# Patient Record
Sex: MALE | Race: WHITE
[De-identification: names, ages, dates, MRNs, and addresses within clinical notes are randomized per-mention and may not be internally consistent; named-entity substitution may affect disease eponyms.]

---

## 2018-07-17 ENCOUNTER — HOSPITAL ENCOUNTER (EMERGENCY)
Dept: HOSPITAL 97 - ER | Age: 83
Discharge: ANOTHER HOSPITAL | End: 2018-07-17
Payer: COMMERCIAL

## 2018-07-17 VITALS — DIASTOLIC BLOOD PRESSURE: 73 MMHG | SYSTOLIC BLOOD PRESSURE: 154 MMHG

## 2018-07-17 VITALS — OXYGEN SATURATION: 97 %

## 2018-07-17 VITALS — TEMPERATURE: 98.2 F

## 2018-07-17 DIAGNOSIS — I26.99: Primary | ICD-10-CM

## 2018-07-17 DIAGNOSIS — E78.5: ICD-10-CM

## 2018-07-17 DIAGNOSIS — I82.4Z3: ICD-10-CM

## 2018-07-17 DIAGNOSIS — I10: ICD-10-CM

## 2018-07-17 DIAGNOSIS — F41.9: ICD-10-CM

## 2018-07-17 DIAGNOSIS — Z86.73: ICD-10-CM

## 2018-07-17 LAB
BUN BLD-MCNC: 21 MG/DL (ref 7–18)
GLUCOSE SERPLBLD-MCNC: 103 MG/DL (ref 74–106)
HCT VFR BLD CALC: 43.2 % (ref 39.6–49)
INR BLD: 1.11
LYMPHOCYTES # SPEC AUTO: 1.4 K/UL (ref 0.7–4.9)
MCH RBC QN AUTO: 31.9 PG (ref 27–35)
MCV RBC: 91.4 FL (ref 80–100)
PMV BLD: 8.5 FL (ref 7.6–11.3)
POTASSIUM SERPL-SCNC: 4.3 MMOL/L (ref 3.5–5.1)
RBC # BLD: 4.72 M/UL (ref 4.33–5.43)

## 2018-07-17 PROCEDURE — 80048 BASIC METABOLIC PNL TOTAL CA: CPT

## 2018-07-17 PROCEDURE — 84484 ASSAY OF TROPONIN QUANT: CPT

## 2018-07-17 PROCEDURE — 71045 X-RAY EXAM CHEST 1 VIEW: CPT

## 2018-07-17 PROCEDURE — 81003 URINALYSIS AUTO W/O SCOPE: CPT

## 2018-07-17 PROCEDURE — 99285 EMERGENCY DEPT VISIT HI MDM: CPT

## 2018-07-17 PROCEDURE — 93306 TTE W/DOPPLER COMPLETE: CPT

## 2018-07-17 PROCEDURE — 93005 ELECTROCARDIOGRAM TRACING: CPT

## 2018-07-17 PROCEDURE — 71275 CT ANGIOGRAPHY CHEST: CPT

## 2018-07-17 PROCEDURE — 85610 PROTHROMBIN TIME: CPT

## 2018-07-17 PROCEDURE — 96365 THER/PROPH/DIAG IV INF INIT: CPT

## 2018-07-17 PROCEDURE — 36415 COLL VENOUS BLD VENIPUNCTURE: CPT

## 2018-07-17 PROCEDURE — 93970 EXTREMITY STUDY: CPT

## 2018-07-17 PROCEDURE — 85379 FIBRIN DEGRADATION QUANT: CPT

## 2018-07-17 PROCEDURE — 85025 COMPLETE CBC W/AUTO DIFF WBC: CPT

## 2018-07-17 NOTE — XMS REPORT
Encounter CCD: 2012 to 2012

 Created on:2122



Patient:ANA PEREZ

Sex:Male

:1935

External Reference #:[id number]





Demographics







 Address  [address] [place] [address]-

 

 Home Phone  [phone]

 

 Preferred Language  Unknown

 

 Marital Status  

 

 Taoist Affiliation  Bah[place]

 

 Race  White/

 

 Ethnic Group  Non-









Author







 Organization  [facility]









Care Team Providers







 Name  Role  Phone

 

 Lenny Humphrey  Consulting Provider  [phone]









Allergies, Adverse Reactions, Alerts







 Substance  Reaction  Status

 

 NKDA    Active







Problem List







 Condition  Effective Dates  Status

 

 Acute pain    Active

## 2018-07-17 NOTE — XMS REPORT
Encounter CCD: 2012 to 2012

 Created on:2085



Patient:ANA PEREZ

Sex:Male

:1935

External Reference #:[id number]





Demographics







 Address  [address] [id number]-

 

 Home Phone  [phone]

 

 Preferred Language  Unknown

 

 Marital Status  

 

 Protestant Affiliation  Mandaeism

 

 Race  White/

 

 Ethnic Group  Non-









Author







 Organization  [facility]









Care Team Providers







 Name  Role  Phone

 

 Noah Walls Martin  Consulting Provider  [phone]

 

 Stacey Stuart  Referring Provider  [phone]









Allergies, Adverse Reactions, Alerts







 Substance  Reaction  Status

 

 NKDA    Active







Problem List







 Condition  Effective Dates  Status

 

 Acute pain    Active







Medications







 Medication  Instructions  Start Date  End Date  Status

 

 morphine Sulfate  2 mg, 1 mL, Route: IV,  2012  Discontinued



   Drug form: INJ, Q4H, PRN      



   Pain, Start date:      



   12 9:01:00,      



   Duration: 30 day, Stop      



   date: 12 9:00:00      

 

 acetaminophen-hydrocodone  1 tab, Route: PO, Drug  2012  
Discontinued



 325 mg-10 mg oral tablet  Form: TAB, Q4H, Start      



   date: 12 9:00:00,      



   Duration: 30 day, Stop      



   date: 12 8:00:00      

 

 fosphenytoin  1,000 mg, 20 mL, Route:  2012  Completed



   IVPB, ONCE, Priority:      



   STAT, Start date:      



   12 22:12:00, Stop      



   date: 12 22:12:00      

 

 Dilantin 100 mg oral  100 mg, 1 cap, Route: PO,  2012  
Discontinued



 capsule, extended release  Drug form: ERCAP, TID,      



   Start date: 12      



   9:00:00, Duration: 7 day,      



   Stop date: 12      



   17:00:00      

 

 D5W 1/2NS 1,000 mL  1,000 mL, Rate: 100  2012  Discontinued



   ml/hr, Infuse over: 10      



   hr, Route: IV, Dosing      



   Weight 91 kg, Total      



   Volume: 1,000, Priority:      



   STAT, Start date:      



   12 1:48:00,      



   Duration: 30 day, Stop      



   date: 12 1:47:00      

 

 Lovenox  30 mg, 0.3 mL, Route:  2012  Canceled



   SUB-Q, Drug form: INJ,      



   Q12H, Start date:      



   12 9:00:00,      



   Duration: 30 day, Stop      



   date: 12 21:00:00      

 

 aspirin 81 mg tablet,  Substitution Allowed  2012  
Discontinued



 chewable        

 

 citalopram  Substitution Allowed  2012    Ordered

 

 Sodium Chloride 0.9%  1,000 mL, Rate: 100  2012  Discontinued



 (Bolus) IV 1,000 mL  ml/hr, Infuse over: 10      



   hr, Route: IV, Dosing      



   Weight 90.909 kg, Total      



   Volume: 1,000, Bolus      



   Dose, Priority: STAT,      



   Start date: 12      



   1:24:00, Duration: 1      



   doses or times, Stop      



   date: 12 11:23:00      

 

 cefazolin  1 gm, Route: IVPB, Drug  2012  Completed



   form: PDR/INJ, ONCE,      



   Priority: STAT, Start      



   date: 12 1:23:00,      



   Stop date: 12      



   1:23:00      

 

 Insulin regular  1 unit, 0.01 mL, Route:  2012  Discontinued



   SUB-Q, Drug form: SOLN,      



   TID-Before Meals, PRN      



   Blood Glucose Results,      



   Start date: 12      



   6:05:00, Duration: 30      



   day, Stop date: 12      



   6:04:00      

 

 Insulin regular  3 unit, 0.03 mL, Route:  2012  Discontinued



   SUB-Q, Drug form: SOLN,      



   TID-Before Meals, PRN      



   Blood Glucose Results,      



   Start date: 12      



   6:05:00, Duration: 30      



   day, Stop date: 12      



   6:04:00      

 

 Insulin regular  2 unit, 0.02 mL, Route:  2012  Discontinued



   SUB-Q, Drug form: SOLN,      



   TID-Before Meals, PRN      



   Blood Glucose Results,      



   Start date: 12      



   6:05:00, Duration: 30      



   day, Stop date: 12      



   6:04:00      

 

 Insulin regular  4 unit, 0.04 mL, Route:  2012  Discontinued



   SUB-Q, Drug form: SOLN,      



   TID-Before Meals, PRN      



   Blood Glucose Results,      



   Start date: 12      



   6:05:00, Duration: 30      



   day, Stop date: 12      



   6:04:00      

 

 Insulin regular  5 unit, 0.05 mL, Route:  2012  Discontinued



   SUB-Q, Drug form: SOLN,      



   TID-Before Meals, PRN      



   Blood Glucose Results,      



   Start date: 12      



   6:05:00, Duration: 30      



   day, Stop date: 12      



   6:04:00      

 

 bisacodyl  10 mg, 2 tab, Route: PO,  2012  Discontinued



   Drug form: ECTAB, Q24H,      



   PRN Constipation, Start      



   date: 12 6:05:00,      



   Duration: 30 day, Stop      



   date: 12 6:04:00      

 

 docusate  100 mg, 1 cap, Route: PO,  2012  Discontinued



   Drug form: CAP, BID, PRN      



   Constipation, Start date:      



   12 6:05:00,      



   Duration: 30 day, Stop      



   date: 12 6:04:00      

 

 acetaminophen-hydrocodone  1 tab, Route: PO, Drug  2012  
Discontinued



 325 mg-10 mg oral tablet  Form: TAB, Q4H, PRN Pain      



   Score 1-3, Start date:      



   12 6:05:00,      



   Duration: 30 day, Stop      



   date: 12 6:04:00      

 

 morphine Sulfate  4 mg, 1 mL, Route: IVP,  2012  Discontinued



   Drug form: INJ, Q4H, PRN      



   Pain Score 7-10, Start      



   date: 12 6:05:00,      



   Duration: 30 day, Stop      



   date: 12 6:04:00      

 

 phenytoin  100 mg, 1 cap, Route: PO,  2012  Discontinued



   Drug form: ERCAP, Q8H,      



   Start date: 12      



   8:00:00, Duration: 7 day,      



   Stop date: 12      



   0:00:00      

 

 cefazolin (SCIP)  1 gm, Route: IVPB, Drug form: PDR/INJ, Q8H, Start date:  10:30:00, Duration: 3 doses or times, Stop date: 12 2:30:00, (for 
patients weighing less than 70 kg)  2012  Discontinued



   (for patients weighing less than 70 kg)      

 

 docusate sodium 100 mg  100 mg, 1 cap, PO, Q12H,  2012    Ordered



 oral capsule  20 cap, Substitution      



   Allowed, CAP      

 

 Norco 5/325 oral tablet  1-2 tab, PO, Q4-6H, PRN,  2012  
Ordered



   30 tab, Pain,      



   Substitution Allowed,      



   Maintenance      

 

 cefazolin  1 gm, Route: IVPB, Drug  2012  Completed



   form: PDR/INJ, ABXQ8H,      



   Start date: 12      



   15:00:00, Duration: 24      



   hr, Stop date: 12      



   7:00:00      

 

 phenytoin 300 mg oral  300 mg, 1 cap, PO,  2012    Ordered



 capsule, extended release  Bedtime, 5 cap,      



   Substitution Allowed      

 

 Keflex 500 mg oral  500 mg, 1 cap, PO, Q8H,  2012    Ordered



 capsule  15 cap, Substitution      



   Allowed      

 

 Zofran  4 mg, 2 mL, Route: IVP,  2012  Discontinued



   Drug form: INJ, Q8H, PRN      



   Nausea & Vomiting, Start      



   date: 12 9:06:00,      



   Duration: 30 day, Stop      



   date: 12 9:05:00      

 

 docusate sodium 100 mg  100 mg, 1 cap, Route: PO,  2012  
Discontinued



 oral capsule  Drug form: CAP, Q12H,      



   Start date: 12      



   21:00:00, Duration: 30      



   day, Stop date: 12      



   9:00:00      

 

 Dilaudid  1 mg, Route: IV, ONCE,  2012  Completed



   Priority: STAT, Start      



   date: 12 2:23:00,      



   Stop date: 12      



   2:23:00      







Vital Signs







 Most recent to oldest  1  2  3



 [Reference Range]:      

 

 Height  190.50 cm  190.50 cm  



   (2012 10:16:00)  (2012 20:28:00)  

 

 Temperature Oral  98.8 DegF  98.0 DegF  97.7 DegF



 [96.4-99.1 DegF]  (2012 16:39:00)  (2012 12:49:00)  (2012 04:
00:00)

 

 Systolic Blood Pressure  142 mmHg  137 mmHg  139 mmHg



 [ mmHg]  *HI*  (2012 12:49:00)  (2012 08:28:00)



   (2012 16:39:00)    

 

 Diastolic Blood Pressure  84 mmHg  78 mmHg  81 mmHg



 [60-90 mmHg]  (2012 16:39:00)  (2012 12:49:00)  (2012 08:28:
00)

 

 Respiratory Rate [14-20  18 BRMIN  18 BRMIN  18 BRMIN



 BRMIN]  (2012 16:39:00)  (2012 12:49:00)  (2012 08:28:00)

 

 Peripheral Pulse Rate  69 bpm  70 bpm  71 bpm



 [ bpm]  (2012 16:39:00)  (2012 12:49:00)  (2012 08:28:
00)

 

 Weight  90.909 kg  90.909 kg  



   (2012 10:16:00)  (2012 20:28:00)  







Results

CHEMISTRY





 Most recent to oldest  1  2  3



 [Reference Range]:      

 

 Sodium Lvl [135-145 mEq/L]  139 mEq/L  137 mEq/L  140 mEq/L



   (2012 04:51:00)  (2012 12:48:00)  (2012 20:03:00)

 

 Potassium Lvl [3.5-5.1  3.9 mEq/L  4.3 mEq/L  4.1 mEq/L



 mEq/L]  (2012 04:51:00)  (2012 12:48:00)  (2012 20:03:00)

 

 Chloride Lvl [ mEq/L]  102 mEq/L  102 mEq/L  104 mEq/L



   (2012 04:51:00)  (2012 12:48:00)  (2012 20:03:00)

 

 CO2 [24-32 mEq/L]  30 mEq/L  25 mEq/L  27 mEq/L



   (2012 04:51:00)  (2012 12:48:00)  (2012 20:03:00)

 

 AGAP [10.0-20.0 mEq/L]  10.9 mEq/L  14.3 mEq/L  13.1 mEq/L



   (2012 04:51:00)  (2012 12:48:00)  (2012 20:03:00)

 

 Creatinine Lvl [0.5-1.4  1.2 mg/dL  1.2 mg/dL  1.1 mg/dL



 mg/dL]  (2012 04:51:00)  (2012 12:48:00)  (2012 20:03:00)

 

 BUN [7-22 mg/dL]  12 mg/dL  14 mg/dL  15 mg/dL



   (2012 04:51:00)  (2012 12:48:00)  (2012 20:03:00)

 

 Glucose Lvl [70-99 mg/dL]  113 mg/dL 1  141 mg/dL 2  105 mg/dL 3



   *HI*  *HI*  *HI*



   (2012 04:51:00)  (2012 12:48:00)  (2012 20:03:00)

 

 Total Protein [6.4-8.4  6.4 g/dL    



 g/dL]  (2012 04:51:00)    

 

 Albumin Lvl [3.5-5.0 g/dL]  3.5 g/dL    



   (2012 04:51:00)    

 

 Globulin [2.0-4.0 g/dL]  2.9 g/dL    



   (2012 04:51:00)    

 

 A/G Ratio [0.7-1.6]  1.2    



   (2012 04:51:00)    

 

 Calcium Lvl [8.5-10.5  8.1 mg/dL  7.8 mg/dL  8.6 mg/dL



 mg/dL]  *LOW*  *LOW*  (2012 20:03:00)



   (2012 04:51:00)  (2012 12:48:00)  

 

 Phosphorus [2.5-4.5 mg/dL]  3.7 mg/dL    



   (2012 04:51:00)    

 

 Magnesium Lvl [1.8-2.4  2.2 mg/dL    



 mg/dL]  (2012 04:51:00)    

 

 ALT [0-65 U/L]  15 U/L    



   (2012 04:51:00)    

 

 AST [0-37 U/L]  7 U/L    



   (2012 04:51:00)    

 

 Alk Phos [ U/L]  59 U/L    



   (2012 04:51:00)    

 

 Bili Total [0.2-1.3 mg/dL]  0.7 mg/dL    



   (2012 04:51:00)    

 

 Bili Direct [0.0-0.3 mg/dL]  0.2 mg/dL    



   (2012 04:51:00)    

 

 Bili Indirect [0.0-1.0  0.5 mg/dL    



 mg/dL]  (2012 04:51:00)    



1Interpretive Data: Adult reference range values reflect the clinical 
guidelinesof the American Diabetes Association.2Interpretive Data: Adult 
reference range values reflect the clinical guidelinesof the American Diabetes 
Association.3Interpretive Data: Adult reference range values reflect the 
clinical guidelinesof the American Diabetes Association.HEMATOLOGY





 Most recent to oldest  1  2  3



 [Reference Range]:      

 

 WBC [3.7-10.4 K/CMM]  7.6 K/CMM  7.9 K/CMM  9.4 K/CMM



   (2012 04:51:00)  (2012 12:48:00)  (2012 20:03:00)

 

 RBC [4.70-6.10 M/CMM]  4.57 M/CMM  4.56 M/CMM  5.13 M/CMM



   *LOW*  *LOW*  (2012 20:03:00)



   (2012 04:51:00)  (2012 12:48:00)  

 

 Hgb [14.0-18.0 g/dL]  13.4 g/dL  13.0 g/dL  14.7 g/dL



   *LOW*  *LOW*  (2012 20:03:00)



   (2012 04:51:00)  (2012 12:48:00)  

 

 Hct [42.0-54.0 %]  39.7 %  40.1 %  44.0 %



   *LOW*  *LOW*  (2012 20:03:00)



   (2012 04:51:00)  (2012 12:48:00)  

 

 MCV [80.0-94.0 fL]  86.9 fL  87.9 fL  85.9 fL



   (2012 04:51:00)  (2012 12:48:00)  (2012 20:03:00)

 

 MCH [27.0-31.0 pg]  29.3 pg  28.6 pg  28.7 pg



   (2012 04:51:00)  (2012 12:48:00)  (2012 20:03:00)

 

 MCHC [32.0-36.0 g/dL]  33.7 g/dL  32.5 g/dL  33.5 g/dL



   (2012 04:51:00)  (2012 12:48:00)  (2012 20:03:00)

 

 RDW [11.5-14.5 %]  14.8 %  14.4 %  13.5 %



   *HI*  (2012 12:48:00)  (2012 20:03:00)



   (2012 04:51:00)    

 

 Platelet [133-450 K/CMM]  161 K/CMM  169 K/CMM  179 K/CMM



   (2012 04:51:00)  (2012 12:48:00)  (2012 20:03:00)

 

 MPV [7.4-10.4 fL]  8.7 fL  8.3 fL  8.4 fL



   (2012 04:51:00)  (2012 12:48:00)  (2012 20:03:00)

 

 Segs [45.0-75.0 %]  51.2 %  66.3 %  47.0 %



   (2012 04:51:00)  (2012 12:48:00)  (2012 20:03:00)

 

 Bands [0.0-11.0 %]  0.0 %    



   (2012 20:03:00)    

 

 Lymphocytes [20.0-40.0 %]  26.5 %  18.8 %  33.0 %



   (2012 04:51:00)  *LOW*  (2012 20:03:00)



     (2012 12:48:00)  

 

 Atypical Lymphs [<=0.0 %]  0.0 %    



   (2012 20:03:00)    

 

 Monocytes [2.0-12.0 %]  9.3 %  10.0 %  5.0 %



   (2012 04:51:00)  (2012 12:48:00)  (2012 20:03:00)

 

 Eosinophils [0.0-4.0 %]  12.3 %  4.4 %  14.0 %



   *HI*  *HI*  *HI*



   (2012 04:51:00)  (2012 12:48:00)  (2012 20:03:00)

 

 Basophils [0.0-1.0 %]  0.7 %  0.5 %  1.0 %



   (2012 04:51:00)  (2012 12:48:00)  (2012 20:03:00)

 

 Segs-Bands # [1.5-8.1  3.9 K/CMM  5.2 K/CMM  4.4 K/CMM



 K/CMM]  (2012 04:51:00)  (2012 12:48:00)  (2012 20:03:00)

 

 Lymphocytes # [1.0-5.5  2.0 K/CMM  1.5 K/CMM  3.1 K/CMM



 K/CMM]  (2012 04:51:00)  (2012 12:48:00)  (2012 20:03:00)

 

 Monocytes # [0.0-0.8  0.7 K/CMM  0.8 K/CMM  0.5 K/CMM



 K/CMM]  (2012 04:51:00)  (2012 12:48:00)  (2012 20:03:00)

 

 Eosinophils # [0.0-0.5  0.9 K/CMM  0.3 K/CMM  1.3 K/CMM



 K/CMM]  *HI*  (2012 12:48:00)  *HI*



   (2012 04:51:00)    (2012 20:03:00)

 

 Basophils # [0.0-0.2  0.1 K/CMM  0.0 K/CMM  0.1 K/CMM



 K/CMM]  (2012 04:51:00)  (2012 12:48:00)  (2012 20:03:00)

 

 Tot Cell Ct  100    



   *NA*    



   (2012 20:03:00)    

 

 RBC Morph  Normal    



   (2012 20:03:00)    

 

 Plt Morph  Normal    



   (2012 20:03:00)    

 

 PT [12.0-14.7 seconds]  13.1 seconds    



   (2012 20:03:00)    

 

 INR [0.85-1.17]  0.99 4    



   (2012 20:03:00)    

 

 PTT [22.9-35.8 seconds]  27.4 seconds 5    



   (2012 20:03:00)    

 

 Rapid TEG Sample Type  Citrated Whole Bld    



   *NA*    



   (2012 00:03:00)    

 

 ACT (TEG) [ seconds]  113 seconds    



   (2012 00:03:00)    

 

 Split Point  0.6 minutes    



   *NA*    



   (2012 00:03:00)    

 

 R-time [0.4-0.7 minutes]  0.7 minutes    



   (2012 00:03:00)    

 

 K-time [0.6-2.3 minutes]  1.5 minutes    



   (2012 00:03:00)    

 

 Angle [64-80 degrees]  73 degrees    



   (2012 00:03:00)    

 

 Max Amp [52-71 mm]  62 mm    



   (2012 00:03:00)    

 

 G-value [5.0-11.6 K d/sc]  8.3 K d/sc    



   (2012 00:03:00)    

 

 Estimated % Lysis [0.0-7.5  4.5 %    



 %]  (2012 00:03:00)    



4Interpretive Data: RECOMMENDED RANGES FOR PROTIME INR:   2.0-3.0 for most 
medical and surgical thromboembolic states.   2.5-3.5 for artificial heart 
valves and recurrent embolism.INR SHOULD BE USED ONLY FOR PATIENTS ON STABLE 
ANTICOAGULANT THERAPY.5Interpretive Data: Heparin Therapeutic Range:  57 - 92 
Seconds

## 2018-07-17 NOTE — RAD REPORT
EXAM DESCRIPTION:  VAS - Extrem Venous W Compress Dominick - 7/17/2018 1:05 pm

 

CLINICAL HISTORY:  Leg pain and swelling, positive pulmonary embolism

 

COMPARISON:  CT chest same date

 

TECHNIQUE:  Real-time sonographic evaluation of the bilateral lower extremity deep venous systems was
 performed.

 

FINDINGS:  Normal compressibility, flow augmentation, phasic flow and spontaneous flow are identified
 in the left and right lower extremity common femoral, superficial femoral and popliteal veins. A mus
patrick or superficial thrombosed branches seen in the posterior left thigh. There is extensive thrombus 
in the bilateral deep femoral vein. Complex 2.2 centimeter mass right popliteal fossa is believed to 
be a popliteal fossa cyst. Vascular origin for this mass is not suspected.

 

IMPRESSION:  Bilateral lower extremity thrombus is seen in an exclusive or predominantly muscle and s
uperficial branch pattern.

## 2018-07-17 NOTE — ER
Nurse's Notes                                                                                     

 [facility]                                                                

Name: Darin Garcia Jr                                                                               

Age: 83 yrs                                                                                       

Sex: Male                                                                                         

: 1935                                                                                   

MRN: [id number]                                                                                   

Arrival Date: 2018                                                                          

Time: 09:49                                                                                       

Account#: [id number]                                                                            

Bed CT                                                                                            

Private MD: out of town, doctor                                                                   

Diagnosis: Pulmonary embolism without acute cor pulmonale;Acute embolism and thrombosis of        

  unspecified deep veins of distal lower extremity, bilateral                                     

                                                                                                  

Presentation:                                                                                     

                                                                                             

09:50 Presenting complaint: Patient states: when i take a deep breath i have a pain in my     hj  

      lower back and i feel weak, started about , July 15 th, denies fever and chills;      

      denies cough;. Transition of care: patient was not received from another setting of         

      care. Onset of symptoms was July 15, 2018. Risk Assessment: Do you want to hurt             

      yourself or someone else? Patient reports no desire to harm self or others. Initial         

      Sepsis Screen: Does the patient meet any 2 criteria? No. Patient's initial sepsis           

      screen is negative. Does the patient have a suspected source of infection? No.              

      Patient's initial sepsis screen is negative. Care prior to arrival: None.                   

09:50 Method Of Arrival: Ambulatory                                                             

09:50 Acuity: SMITH 3                                                                           hj  

                                                                                                  

Triage Assessment:                                                                                

09:54 General: Appears in no apparent distress. uncomfortable, Behavior is calm, cooperative, hj  

      appropriate for age. Pain: Complains of pain in back.                                       

                                                                                                  

Historical:                                                                                       

- Allergies:                                                                                      

09:53 No Known Allergies;                                                                     hj  

- Home Meds:                                                                                      

09:53 sertraline oral oral [Active]; vitamin B12-folic acid oral oral [Active];               hj  

- PMHx:                                                                                           

09:53 Anxiety; CVA; Hyperlipidemia; Hypertension; TIA;                                        hj  

- PSHx:                                                                                           

09:53 Appendectomy; prostate removal; left hip replacement; Cholecystectomy;                  hj  

                                                                                                  

- Immunization history:: Adult Immunizations not up to date.                                      

- Social history:: Smoking status: Patient/guardian denies using tobacco,                         

  Patient/guardian denies using alcohol.                                                          

- Ebola Screening: : Patient negative for fever greater than or equal to 101.5 degrees            

  Fahrenheit, and additional compatible Ebola Virus Disease symptoms Patient denies               

  exposure to infectious person Patient denies travel to an Ebola-affected area in the            

  21 days before illness onset.                                                                   

                                                                                                  

                                                                                                  

Screenin:54 Abuse screen: Denies threats or abuse. Denies injuries from another. Nutritional        hj  

      screening: No deficits noted. Tuberculosis screening: No symptoms or risk factors           

      identified. Fall Risk None identified.                                                      

                                                                                                  

Assessment:                                                                                       

10:10 General: Appears comfortable, Behavior is calm, cooperative. Pain: Complains of pain in aa5 

      right mid back area Pain does not radiate. Pain currently is 0 out of 10 on a pain          

      scale. Quality of pain is described as sharp, Pain began 2-3 days ago. Is episodic,         

      Aggravated by deep breathing. Pt reports pain only when taking a deep breath. Neuro:        

      Level of Consciousness is awake, alert, obeys commands, Oriented to person, place,          

      time, situation,  are equal bilaterally Moves all extremities. Speech is normal,       

      Facial symmetry appears normal, Pupils are PERRLA, Reports generalized weakness for "a      

      few months" . Cardiovascular: Heart tones S1 S2 present Rhythm is sinus rhythm.             

      Respiratory: Airway is patent Respiratory effort is even, unlabored, Respiratory            

      pattern is regular, symmetrical, Breath sounds are clear bilaterally. Denies cough,         

      shortness of breath. GI: Abdomen is flat, non-distended, Bowel sounds present X 4           

      quads. Abd is soft and non tender X 4 quads. Patient currently denies diarrhea, nausea,     

      vomiting. : No signs and/or symptoms were reported regarding the genitourinary            

      system. EENT: No signs and/or symptoms were reported regarding the EENT system. Derm:       

      Skin is pink, warm \T\ dry. Musculoskeletal: Range of motion: intact in all extremities.    

11:00 Reassessment: Patient and/or family updated on plan of care and expected duration. Pain aa5 

      level reassessed. Patient is alert, oriented x 3, equal unlabored respirations, skin        

      warm/dry/pink. Patient denies pain at this time.                                            

12:20 Reassessment: To bedside to administer Heparin, Pt currently in US.                     aa5 

13:10 Reassessment: Patient and/or family updated on plan of care and expected duration. Pain aa5 

      level reassessed. Patient is alert, oriented x 3, equal unlabored respirations, skin        

      warm/dry/pink. Pt back from US, Heparin administered (see MAR).                             

13:10 Reassessment: Strict bedrest per MD.                                                    aa5 

14:40 Reassessment: Patient is alert, oriented x 3, equal unlabored respirations, skin        aa5 

      warm/dry/pink.                                                                              

                                                                                                  

Vital Signs:                                                                                      

09:54  / 63; Pulse 83; Resp 18; Temp 98.2(O); Pulse Ox 95% on R/A; Weight 83.91 kg;     hj  

      Height 6 ft. 3 in. (190.50 cm); Pain 3/10;                                                  

10:29  / 65; Pulse 71; Resp 16 S; Pulse Ox 95% on R/A; Pain 0/10;                       aa5 

11:00  / 50; Pulse 68; Resp 16 S; Pulse Ox 97% on R/A;                                  aa5 

13:38  / 73; Pulse 61; Resp 18; Pulse Ox 97% on R/A;                                    mh5 

14:30  / 71; Pulse 60; Resp 16 S; Pulse Ox 98% on R/A; Pain 0/10;                       aa5 

09:54 Body Mass Index 23.12 (83.91 kg, 190.50 cm)                                               

                                                                                                  

ED Course:                                                                                        

09:49 Patient arrived in ED.                                                                  mr  

09:49 out of town, doctor is Private Physician.                                               mr  

09:52 Triage completed.                                                                       hj  

09:54 Arm band placed on left wrist.                                                          hj  

09:54 Patient has correct armband on for positive identification. Bed in low position. Call     

      light in reach. Side rails up X 1. Adult w/ patient.                                        

09:58 Gabriel Turcios MD is Attending Physician.                                              gs  

10:16 Jaja Cadet, RN is Primary Nurse.                                                   aa5 

10:20 Initial lab(s) drawn, by me, sent to lab. Inserted saline lock: 20 gauge in right       jb4 

      antecubital area, using aseptic technique. Blood collected.                                 

10:34 No provider procedures requiring assistance completed.                                  aa5 

10:46 X-ray completed. Portable x-ray completed in exam room. Patient tolerated procedure     sw  

      well.                                                                                       

10:47 XRAY Chest (1 view) In Process Unspecified.                                             EDMS

11:04 Notified ED physician of a critical lab result(s). D-dimer=19,403.                      iw  

11:40 CT Chest For PE Angio In Process Unspecified.                                           EDMS

12:54 US Extremity Venous W Compression Dominick In Process Unspecified.                           EDMS

13:12 Ultrasound completed. Patient tolerated well. Patient moved back from ultrasound.       hr  

13:57 Report given to [place] [address] [place].                                              aj  

14:40 Patient transferred, IV remains in place.                                               aa5 

                                                                                                  

Administered Medications:                                                                         

13:10 Drug: Heparin (DVT/PE Drip) 18 units/kg/hr - (HEParin 40198 units, D5W 500 ml)            

      {Co-Signature: aa5 (Jaja Cadet RN).} Route: IV; Rate: calculated rate; Site: right      

      antecubital;                                                                                

14:30 Follow up: IV Status: Infusion continued upon transfer                                  aa5 

                                                                                                  

                                                                                                  

Outcome:                                                                                          

13:01 ER care complete, transfer ordered by MD.                                                 

14:40 Transferred by ground EMS to [facility], Transfer form completed.    aa5 

      X-rays sent w/ patient. Note:  Report given to [facility]                             

14:40 Condition: stable                                                                           

14:40 Discharge instructions given to patient, significant other, Instructed on the need for      

      transfer, Demonstrated understanding of instructions.                                       

14:49 Patient left the ED.                                                                    aa5 

                                                                                                  

Signatures:                                                                                       

Dispatcher [employer]                           Jeanne Atkins, RN                       Ruth Torres                                mr                                                   

Jr, Ksenia Holley, Jaja Recinos RN, RN RN   aa5                                                  

Sharon Sanon Henry, RN RN hj Bryson, James, RN RN jb4 Martinez, Maria                              [facility]                                                  

Gabriel Turcios MD MD                                                      

Jaja Cadet RN                            aa5                                                  

                                                                                                  

Corrections: (The following items were deleted from the chart)                                    

09:57 09:50 Presenting complaint: Patient states: when i take a deep breath i have a pain in  hj  

      my lower back, started about , July 15 th, denies fever and chills; denies cough;     

      hj                                                                                          

09:57 09:54 Pulse 83bpm; Resp 18bpm; Pulse Ox 95% RA; Temp 98.2F Oral; 83.91 kg; Height 6 ft. hj  

      3 in.; BMI: 23.1; Pain 3/10; hj                                                             

                                                                                                  

**************************************************************************************************

## 2018-07-17 NOTE — XMS REPORT
Summary of Care: 2014 - 2014

 Created on:May 19, 2052



Patient:ANA PEREZ

Sex:Male

:1935

External Reference #:[id number]





Demographics







 Address  [address] [place] [address]-

 

 Home Phone  [phone]

 

 Preferred Language  English

 

 Marital Status  

 

 Jain Affiliation  Mosque

 

 Race  White/

 

 Ethnic Group  Non-









Author







Encounter







 Dates  Location  Diagnoses  Discharge  Providers



       Disposition  

 

 2014 -  [facility]  Final: Personal History of Transient 
Ischemic Attack (TIA), and Cerebral Infarction without Residual Deficits  Home  
Pawel Adler



 2014  [address] [place] [address]      Pawel Adler



   [address]    , [place]  Final: Unspecified Transient Cerebral 
Ischemia    Kenneth Ellis



         



     Final: Unspecified Essential Hypertension    







Reason for Visit

TRANS-ISCHEMIC ATTACK



Vital Signs







 Most recent to oldest  1  2  3



 [Reference Range]:      

 

 Height  190.5 cm  190.5 cm  



   (2014 00:07:00 [facility])  [facility]
  

 

 Temperature Oral  97.9 DegF  98.4 DegF  96.5 DegF



 [96.4-99.1 DegF]  (2014 13:17:00 [facility])  (2014 08:53:00 
[facility])  (2014 04:32:00 [facility])

 

 Systolic Blood Pressure  137 mmHg  159 mmHg  121 mmHg



 [ mmHg]  (2014 19:25:00 [facility])  *HI*  (2014 18:00:
00 [facility])



     (2014 18:47:00 [facility])  

 

 Diastolic Blood Pressure  78 mmHg  87 mmHg  54 mmHg



 [60-90 mmHg]  (2014 19:25:00 [facility])  (2014 18:47:00 
[facility])  *LOW*



       (2014 18:00:00 [facility])

 

 Respiratory Rate [14-20  31 BRMIN  33 BRMIN  15 BRMIN



 BRMIN]  *HI*  *HI*  (2014 18:00:00 [facility])



   (2014 19:25:00 [facility])  (2014 18:47:00 [facility])
  

 

 Peripheral Pulse Rate  90 bpm  70 bpm  74 bpm



 [ bpm]  (2014 08:53:00 [facility])  (2014 04:32:00 
[facility])  (2014 00:17:00 [facility])

 

 Weight  90.909 kg  90.007 kg  



   (2014 00:07:00 [facility])  (2014 23:50:00 [facility])
  

 

 Body Mass Index  25.05 m2  24.8 m2  



   (2014 00:07:00 [facility])  (2014 23:50:00 [facility])
  







Problem List







 Condition  Effective Dates  Status  Health Status  Informant

 

 Acute pain(Confirmed)    Active    

 

 Prostate cancer(Confirmed)    Resolved    

 

 TIA (transient ischemic    Resolved    



 attack)(Confirmed)        







Allergies, Adverse Reactions, Alerts







 Substance  Reaction  Severity  Status

 

 NKDA      Active







Medications







 Medication  Instructions  Start Date  Stop Date  Status

 

 acetaminophen  650 mg, 2 tab, Route: PO, Drug form: TAB, Q4H, Dosing Weight 
90.909, kg, PRN Pain 1-3/Temp > 99.5 F, Start date: 14 5:46:00, Duration: 
30 day, Stop date: 14 5:45:00  2014  Discontinued



   Do not exceed 4 gm/day.  (Same as: Tylenol)      

 

 aspirin  325 mg, 1 tab, Route: PO, Drug form: TAB, Daily, Dosing Weight 90.909
, kg, Priority: NOW, Start date: 14 1:20:00, Duration: 30 day, Stop date: 
14 9:00:00  2014  Discontinued



   Take with food.      

 

 aspirin  81 mg, 1 tab, Route: PO, Drug form: ECTAB, Daily, Dosing Weight 90.909
, kg, Start date: 14 13:30:00, Duration: 30 day, Stop date: 14 9:00:
00  2014  Discontinued



   Do not crush or chew.(Same As: Ecotrin)      

 

 aspirin 81 mg tablet,  81 mg=1 tab, PO, Daily,  2014    Ordered



 enteric coated  # 30 tab, 0 Refill(s)      

 

 aspirin 81 mg tablet,  81 mg=1 tab, PO, Daily  2014  
Discontinued



 enteric coated        

 

 atorvastatin  80 mg, 1 tab, Route: PO, Drug form: TAB, Bedtime, Dosing Weight 
90.909, kg, Priority: NOW, Start date: 14 1:20:00, Duration: 30 day, Stop 
date: 14 21:00:00  2014  Discontinued



   Same as Lipitor      

 

 atorvastatin 80 mg oral  80 mg=1 tab, PO,  2014    Ordered



 tablet  Bedtime, # 30 tab, 0      



   Refill(s)      

 

 enoxaparin  40 mg, 0.4 mL, Route: SUB-Q, Drug form: INJ, [id number], Dosing 
Weight 90.909, kg, Start date: 14 6:00:00, Duration: 30 day, Stop date:  6:00:00  2014  Discontinued



   (Same as: Lovenox)      

 

 lisinopril  5 mg, 1 tab, Route: PO, Drug form: TAB, Daily, Dosing Weight 90.909
, kg, Start date: 14 9:00:00, Duration: 30 day, Stop date: 14 9:00:
00  2014  Canceled



   (Same as: Prinivil, Zestril)      

 

 lisinopril  40 mg, 2 tab, Route: PO, Drug form: TAB, Daily, Dosing Weight 
90.909, kg, Priority: NOW, Start date: 14 12:07:00, Duration: 30 day, 
Stop date: 14 9:00:00  2014  Discontinued



   (Same as: Prinivil, Zestril)      

 

 lisinopril 20 mg oral  40 mg=2 tab, PO, Daily,  2014  
Discontinued



 tablet  # 30 tab, 0 Refill(s)      

 

 lisinopril 5 mg oral  5 mg=1 tab, PO, Daily, #  2014    Ordered



 tablet  30 tab, 0 Refill(s)      

 

 multivitamin  1 tablet, PO, Daily, 0  2014    Ordered



   Refill(s)      

 

 multivitamin  1 tab, Route: PO, Drug Form: TAB, Dosing Weight 90.909, kg, Daily
, Start date: 14 9:00:00, Duration: 30 day, Stop date: 14 9:00:00  
2014  Canceled



   (Same as:Thera)  Take with food.      

 

 Ocuvite oral tablet  1 tab, PO, Daily, # 30  2014    Ordered



   tab, 0 Refill(s)      

 

 Omnipaque 350mg/ml  85 mL, Route: IVP, Drug Form: SOLN, Dosing Weight 90.909, 
kg, ONCALL, STAT, Start date: 14 6:27:00, Duration: 1 doses or times, Dose
=2.2ml/kg,  Max dose=100ml -- "To be infused by Radiology Staff ONLY"  2014  Completed



   Dose=2.2ml/kg,  Max dose=100ml -- "To be infused by Radiology Staff ONLY"   
   



   (Same as:Omnipaque 350).      

 

 Vitamin B12  1 tablet, PO, Daily, 0  2014    Ordered



   Refill(s)      

 

 Vitamin B12  1,000 microgram, 1 tab, Route: PO, Drug form: TAB, Daily, Dosing 
Weight 90.909, kg, Start date: 14 9:00:00, Duration: 30 day, Stop date:  9:00:00  2014  Canceled



   (Same As: Vitamin B-12)      

 

 Zoloft  100 mg, 1 tab, Route: PO, Drug form: TAB, Daily, Dosing Weight 90.909, 
kg, Start date: 14 14:00:00, Duration: 30 day, Stop date: 14 9:00:
00  2014  Discontinued



   (Same as: Zoloft)      

 

 Zoloft 100 mg oral tablet  100 mg=1 tab, PO, Daily  2014    Ordered







Results

ELECTROLYTES





 Most recent to oldest [Reference Range]:  1  2

 

 Sodium Lvl [135-145 mEq/L]  140 mEq/L  



   (2014 02:10:00 [facility])  

 

 Potassium Lvl [3.5-5.1 mEq/L]  4.1 mEq/L  



   (2014:10:00 [facility])  

 

 Chloride Lvl [ mEq/L]  106 mEq/L  



   (2014:10: [facility])  

 

 CO2 [24-32 mEq/L]  26 mEq/L  



   (2014:10: [facility])  

 

 AGAP [10.0-20.0 mEq/L]  12.1 mEq/L  



   (2014:10: [facility])  



CHEM PANEL





 Most recent to oldest [Reference Range]:  1  2

 

 Creatinine Lvl [0.5-1.4 mg/dL]  1.0 mg/dL  



   (2014:10: [facility])  

 

 eGFR  73 mL/min/1.73m2 1  



   *NA*  



   (2014:10:00 [facility])  

 

 BUN [7-22 mg/dL]  22 mg/dL  



   (2014:10: [facility])  

 

 Glucose Lvl [70-99 mg/dL]  112 mg/dL 2  



   *HI*  



   (2014:10:00 [facility])  

 

 Calcium Lvl [8.5-10.5 mg/dL]  8.3 mg/dL  



   *LOW*  



   (2014:10:00 [facility])  

 

 Phosphorus [2.5-4.5 mg/dL]  3.4 mg/dL  



   (2014:10:00 [facility])  

 

 Magnesium Lvl [1.8-2.4 mg/dL]  2.0 mg/dL  



   (2014:10: [facility])  



1Result Comment: The eGFR is calculated using the CKD-EPI formula. In most young
, healthy individualsthe eGFR will be >90 mL/min/1.73m2. The eGFR declines with 
age. An eGFR of 60-89 may be normal in some populations, particularly the 
elderly, for whom the CKD-EPI formula has not been extensively validated. Use 
of the eGFR is not recommended in the following populations:



Individuals with unstable creatinine concentrations, including pregnant 
patients and those with serious co-morbid conditions.



Patients with extremes in muscle mass or diet.



The data above are obtained from the National Kidney Disease Education Program (
NKDEP) which additionally recommends that when the eGFR is used in patients 
with extremes of body mass index for purposesof drug dosing, the eGFR should be 
multiplied by the estimated BMI.2Interpretive Data: Adult reference range 
values reflect the clinical guidelines

of the American Diabetes Association.CARDIAC ENZYMES





 Most recent to oldest [Reference  1  2



 Range]:    

 

 Total CK [ unit/L]  93 unit/L  58 unit/L



   (2014 15:21:39 [facility])  (2014 11:36:00 [facility])

 

 CK MB [0.5-3.6 ng/mL]  1.2 ng/mL  



   (2014 15:21:39 [facility])  

 

 CK MB Index [0.0-2.5]  1.3  



   (2014 15:21:39 [facility])  

 

 Troponin-T [0.000-0.100 ng/mL]  <0.010 ng/mL  



   (2014 11:36:00 [facility])  

 

 Troponin-I [0.00-0.40 ng/mL]  <0.02 ng/mL  



   (2014 11:36:00 [facility])  



LIPIDS





 Most recent to oldest [Reference Range]:  1  2

 

 CHD Risk [4.00-7.30]  7.17  



   (2014 02:10:00 [facility])  

 

 Chol [<=199 mg/dL]  165 mg/dL  



   (2014 02:10:00 [facility])  

 

 Trig [<=149 mg/dL]  118 mg/dL  



   (2014 02:10:00 [facility])  

 

 HDL [>=61 mg/dL]  23 mg/dL  



   *LOW*  



   (2014 02:10:00 [facility])  

 

 LDL (Calculated) [<=99 mg/dL]  118 mg/dL  



   *HI*  



   (2014 02:10:00 [facility])  



SPECIAL CHEMISTRY





 Most recent to oldest [Reference Range]:  1  2

 

 Hgb A1C [<=5.6 %]  5.6 %  



   (2014 02:10:00 [facility])  



URINE AND STOOL





 Most recent to oldest [Reference Range]:  1  2

 

 UA Turbidity [Clear]  Clear  



   (2014 11:35:03 [facility])  

 

 UA Color [Yellow]  Yellow  



   *NA*  



   (2014 11:35:03 [facility])  

 

 UA pH [5.0-8.0]  6.5  



   (2014 11:35:03 [facility])  

 

 UA Spec Grav [<=1.030]  >=1.050  



   *ABN*  



   (2014 11:35:03 [facility])  

 

 UA Glucose [Negative mg/dL]  Negative mg/dL  



   *NA*  



   (2014 11:35:03 [facility])  

 

 UA Blood [Negative]  Negative  



   (2014 11:35:03 [facility])  

 

 UA Ketones [Negative mg/dL]  Negative mg/dL  



   *NA*  



   (2014 11:35:03 [facility])  

 

 UA Protein [Negative mg/dL]  50 mg/dL  



   *ABN*  



   (2014 11:35:03 [facility])  

 

 UA Urobilinogen [0.1-1.0 mg/dL]  2.0 mg/dL  



   *HI*  



   (2014 11:35:03 [facility])  

 

 UA Bili [Negative]  Negative  



   *NA*  



   (2014 11:35:03 [facility])  

 

 UA Leuk Est [Negative]  Negative  



   (2014 11:35:03 [facility])  

 

 UA Nitrite [Negative]  Negative  



   (2014 11:35:03 [facility])  

 

 UA WBC [0-5 /HPF]  1 /HPF  



   (2014 11:35:03 [facility])  

 

 UA RBC [0-2 /HPF]  1 /HPF  



   (2014 11:35:03 [facility])  

 

 UA Sq Epi [Few /LPF]  Occasional /LPF  



   *NA*  



   (2014 11:35:03 [facility])  

 

 UA Renal Epi [<=0 /LPF]  1 /LPF  



   *HI*  



   (2014 11:35:03 [facility])  

 

 UA Mucus [None Seen /LPF]  Few /LPF  



   *NA*  



   (2014 11:35:03 [facility])  



HEMATOLOGY





 Most recent to oldest [Reference Range]:  1  2

 

 WBC [3.7-10.4 K/CMM]  9.1 K/CMM  



   (2014 02:10:00 [facility])  

 

 RBC [4.70-6.10 M/CMM]  5.06 M/CMM  



   (2014 02:10:00 [facility])  

 

 Hgb [14.0-18.0 g/dL]  15.6 g/dL  



   (2014:10:00 [facility])  

 

 Hct [42.0-54.0 %]  45.8 %  



   (2014 02:10:00 [facility])  

 

 MCV [80.0-94.0 fL]  90.6 fL  



   (2014 02:10:00 [facility])  

 

 MCH [27.0-31.0 pg]  30.8 pg  



   (2014:10:00 [facility])  

 

 MCHC [32.0-36.0 g/dL]  34.0 g/dL  



   (2014:10: [facility])  

 

 RDW [11.5-14.5 %]  14.3 %  



   (2014:10:00 [facility])  

 

 Platelet [133-450 K/CMM]  181 K/CMM  



   (2014:10:00 [facility])  

 

 MPV [7.4-10.4 fL]  9.0 fL  



   (2014 02:10:00 [facility])  

 

 Segs [45.0-75.0 %]  63.5 %  



   (2014:10:00 [facility])  

 

 Lymphocytes [20.0-40.0 %]  24.3 %  



   (2014 02:10:00 [facility])  

 

 Monocytes [2.0-12.0 %]  9.3 %  



   (2014:10:00 [facility])  

 

 Eosinophils [0.0-4.0 %]  2.6 %  



   (2014 02:10:00 [facility])  

 

 Basophils [0.0-1.0 %]  0.3 %  



   (2014:10:00 [facility])  

 

 Segs-Bands # [1.5-8.1 K/CMM]  5.8 K/CMM  



   (2014 02:10:00 [facility])  

 

 Lymphocytes # [1.0-5.5 K/CMM]  2.2 K/CMM  



   (2014 02:10:00 [facility])  

 

 Monocytes # [0.0-0.8 K/CMM]  0.9 K/CMM  



   *HI*  



   (2014 02:10:00 [facility])  

 

 Eosinophils # [0.0-0.5 K/CMM]  0.2 K/CMM  



   (2014 02:10:00 [facility])  







Medications Administered During Your Visit

No data available for this section



Immunizations

No data available for this section



Procedures







 Procedure Type  Body Site  Date of Procedure  Related Diagnosis

 

 Cholecystectomy      

 

 Hip replacement      







Social History







 Social History Type  Response







Assessment and Plan

Extracted from:





 Title: Clinical Document  Author: Nicol Pires  Date: 2014









 [facility] TRANSFER/DISCHARGE SUMMARY



 



 



 Date of Admission:



 14



 Date of Discharge/Transfer:



 14



 Admit Diagnosis:



 dizziness and LLE plegia



 Discharge Diagnosis:



 TIA



 Etiology of Stroke:



 n/a



 Brief HPI:



 Pt is a 79 yo R handed  with PMH significant for HTN and 2 TIAs who 
presents with acute onset of nausea, SOB, dizziness about 4 pm after carring a 
diswasher outside for a garage sale.  He produ



 michael emesis x 3, became aphasic..  Per EMS he had LLE plegia.  He denied HA 
during this time and his returned to his baseline by the time he reached the 
OSH.



 Testing/Labs: EKG from OSH: NSR w/ 1st degree AV Block; WBC: 14.7, Neutrophilia
: 85.6  Glucose: 140  Creatinine: 1.28



 He did take aspirin on a daily basis however stopped for the past week 2/2 
easy bruising of his hands.



 His previous TIAs occured in  both presented with RUE weakness, at 
that time he was found to have a carotid us showing L ICA stenosis of 50%.



 Pt did not get tPA since his symptoms rapidally improved and he was outside 
the window.



 Hospital Course:



 MRI negative for acute stroke but reveals chronic microhemorrhages. Patient 
deemed stable for discharge home.



 Discharge Physical Examination:



 AAO3, no facial asymetry, no motor deficits, no sensory deficits, no dysmetria
, no gait abnormalities



 Discharge Medications:



 see HMR



 Disposition:  HOME



 Follow up:



 

 Stroke Fellow Clinic within 4-6 weeks, call 



 



 

 Discharge Instructions:



 

 The patient received stroke education regarding signs and symptoms of stroke.
  They were instructed to call 911 if similar symptoms occurred again.  The 
list of their medications on discharged was r



 mariewed with the patient and all questions were answered.





Extracted from:





 Title: Neurology  Author: Anna Diop  Date: 2014









 Neurology



 Patient Name: Ana Perez



 MRN: [id number]



 Date of Admission: 14



 



 Requesting Physician/Service: OSH Transfer



 



 CC: Nauseous



 



 HISTORY OF PRESENT ILLNESS:



 Pt is a 79 yo R handed  with PMH significant for HTN and 2 TIAs who 
presents with acute onset of nausea, SOB, dizziness about 4 pm after carring a 
diswasher outside for a garage sale.  He produ



 michael emesis x 3, became aphasic..  Per EMS he had LLE plegia.  He denied HA 
during this time and his returned to his baseline by the time he reached the 
OSH.



 Testing/Labs: EKG from OSH: NSR w/ 1st degree AV Block; WBC: 14.7, Neutrophilia
: 85.6  Glucose: 140  Creatinine: 1.28



 He did take aspirin on a daily basis however stopped for the past week 2/2 
easy bruising of his hands.



 His previous TIAs occured in  both presented with RUE weakness, at 
that time he was found to have a carotid us showing L ICA stenosis of 50%.



 Pt did not get tPA since his symptoms rapidally improved and he was outside 
the window.



 



 REVIEW OF SYSTEMS:



 GEN: no fever, chills, weight loss, fatigue



 EYES: no blurred vision, double vision



 CARDIO: no chest pain, palpitations



 PULM: no shortness of breath, cough



 GI: no nausea, vomiting, diarrhea, no abd pain, no constipation



 : no frequency, dysuria, burning, hematuria



 NEURO: see HPI



 SKIN: no rash or lesion



 ENDOCRINE: cold/heat intolerance



 MUSCULOSKELETAL: No joint pain



 



 PAST MEDICAL HISTORY



 HTN



 PAST SURGICAL HISTORY



 Prostate, L Hip Replacement, Cholecystectomy, Dental procedures 2/2 impacted 
tooth



 FAMILY MEDICAL HISTORY



 unknown



 SOCIAL HISTORY



 Pt is , lives with wife.  Smoked briefly in college, drinks a glass of 
red wine daily for health benefits, denies drug use.



 



 HOME MEDS



 NONE



 



 ALLERGIES



 NKDA



 



 PHYSICAL EXAM



 



 Vitals and Temp:



 



 Vitals Tmp(F) Pulse BP RR SpO2 FIO2



  04:32 96.5 70 147/78 20 100 ---



  00:17 96.0 74 155/92 18 95 ---



 



 24 Hr Tmax: 96.5F (35.83c) at  04:32 Vital Signs are the last 5 in the 
past 48 hours.



 



 



 GENERAL: Awake, alert, NAD.



 HEENT: - Normocephalic and atraumatic; MMM



 LUNGS - Clear to auscultation bilaterally with no wheezes



 CV - S1S2 RRR, no m/r/g, equal pulses bilaterally, no carotid bruit



 ABDOMEN - Soft, nontender, non-distended with normoactive BS



 



 



 NEURO:



 1. Mental Status: Patient is awake alert, fully oriented to person, place and 
time.



 AAO x _3_ PERRL _2_mm brisk EOMI, tongue midline, no facial asymmetry



 2. Speech/language: Naming, Repetition, comprehension and fluency are intact.



 3. Cranial Nerves:



 EOMI, visual fields full, pupil 3 mm reactive bilaterally, facial sensation 
intact, face symmetric, hearing intact, tongue/uvula/soft palate midline, 
normal sternocleidomastoid and trapezius muscle stre



 ngth. No evidence of tongue atrophy or fibrillations



 4. Motor:



 Strength/Power:



 R UE- Deltoid 5/5, Triceps 5/5, Biceps 5/5, Wrist flexion 5/5, Wrist extension 
5/5



 L UE- Deltoid 5/5, Triceps 5/5, Biceps 5/5, Wrist flexion 5/5, Wrist extension 
5/5



 R LE- Illopsoas 5/5, Knee extension 5/5, Knee flexion 5/5, dorsiflexion 5/5, 
plantar flexion 5/5



 LLE- Illopsoas 5 /5, Knee extension5 /5, Knee flexion 5/5, dorsiflexion 5/5, 
plantar flexion 5/5



 Hemiparesis and/or hemiplegic



 Tone is normal



 Deep Tendon Reflexes-



 R Triceps2+, Biceps 2+, Brachioradialis 2+, Patellar 2+, Ankle 2+



 L Triceps2+, Biceps 2+, Brachioradialis 2+, Patellar 2+, Ankle 2+



 Toes down going bilaterally



 6. Sensation- intact to pinprick, temperature, vibration, and proprioception 
and equal bilaterally



 7. Coordination: FTN wnl, heel to shin WNL with no signs of dysmetria , no 
ataxia noted



 8. Gait-DEFERRED



 







 



 NIHSS   TOTAL:  ___0_____



 1a. Level of Consciousness



 __0__        0-alert  1-drowsy  2-stupor



 1b. LOC Questions   month and age



 _0___        0-both   1-one   2-neither



 1c. LOC Commands   open/ close the eyes and then to  and release the non-
paretic hand



 __0__        0-both   1-one   2-neither



 2. Best Gaze



 __0__        0-nl    1-partial    2-forced gaze



 3. Visual Fields



 _0___        0=No visual loss. 1=Partial hemianopia. 2=Complete hemianopia. 3=
Bilateral hemianopia



 4. Facial Palsy



 _0___        0-none  1-minor  2-partial    3-complete



 0=No drift; leg holds 30-degree position for full 5 seconds.  1=Drift; leg 
falls by the end of the 5-second period but does not hit bed.  2=Some effort vs 
gravity; leg falls by 5 seconds, some effort vs



  gravity.  3=No effort vs gravity; leg falls to bed immediately.  4=No 
movement.



 5-8. Motor



 __0__R. arm



 __0__L. arm



 _0___R. leg



 __0__L. leg



 



 9. Limb Ataxia



 _0___        0 absent    1 - 1limb    2 - 2 limbs



 10. Sensory



 _0___        0-nl      1-partial loss   2-dense loss



 11. Best Language



 _0___        0-nl     1-mild/mod    2-severe  3-mute



 12. Dysarthria



 __0__        0-nl     1-mild/mod    2-severe     x-untestable



 13. Extinction and Inattention (formerly Neglect):



 __0__        0-none   1-partial    2-complete



 



 SIGNIFICANT LABS:



 Creatinine: 1.0 ,WBC: 9.1  ,Glucose: 112



 



 DIAGNOSTIC TESTS:



 CT Head: no acute abnormalities observed, no hemorrhage observed on personal 
review.



 



 THE FOLLOWING WERE PRESENT ON ADMISSION (POA)



 Cardiovascular



      HTN



 EKG



 Metabolic



     Hyperglycemia



 



 



 ASSESSMENT:



 Pt is a 79 yo R handed  with PMH significant for HTN and 2 TIAs who 
presents with acute onset of nausea, SOB, dizziness about 4 pm after carring a 
diswasher outside for a garage sale. His sympt



 oms are concerning for possible posterior circulation stroke. However now his 
symptoms have resolved and he has returned to his baseline. It would best to 
perform a full stroke work up to provided the a



 dequate secondary stroke preventio for Mr. Perez.



 



 



 PLAN



 



 # TIA:



 - Location: Possible Posterior Circulation



 - Etiology: Likely large artery vs cardioembolic



 - Admit to Stroke unit under Dr. Adler



 - BP goal : permissive HTN



 -  mg po daily



 - Lipitor 80 mg po daily, will order lipid panel and hepatic panel and adjust 
Lipitor accordingly



 - cerebral edema: No



 - will order A1c and start sliding scale insulin for tight glucose control



 - will get MRI of brain in the morning



 - will get 2D echo



 - PT/OT/ ST evaluation in the morning



 - DVT Prophylaxis: Lovenox 40mg q d, SCD, JAYCOB



 - GI Prophylaxis: Not indicated



 - Bowel Prophylaxis: Colace and Senna



 



 



 The patient was discussed with  __Valentin______, the attending/fellow on 
call.



 



 Anna Diop MD



 Neurology PGY-2



 MSO : [id number]



 Pager : [phone]



 



 [facility] STAFF



 



 I have seen and examined the patient.  Furthermore, I have discussed the case 
with and reviewed Dr. Diop's note and agree with the history, exam, assessment 
and plan.  See note below for additions and/



 or exceptions and my findings.  I have personally viewed the patient's 
radiographic studies and laboratory tests.



 



 77yo male with PMH s/f left carotid stenosis, TIAs (RUE sensory loss) who had 
acute onset N/V and difficulty speaking.  Pt was also found to have Left lower 
extremity weakness.  He returned to baseline



 and went to an OSH, then transferred here for a higher level of care.



 



 Notable Labs:  LDL: 118   HgbA1c: 5.6



 



 CT-head:  negative acute, no ICH.



 CT-angiogram head/neck:  no significant, flow-limiting stenoses or occlusions, 
but left ICA at bulb is 57% stenotic.



 MRI brain: no acute infarctions.  Severe old microhemorrhages in the bilateral 
occipital lobes.



 



 History of Carotid Stenosis - left ICA 57%.



 



 Transthoracic Echocardiogram:  pending



 



 Assessment / Plan:  [id number]



 



 TIA - 435.9



   - see below.



 



 Asymptomatic left Carotid stenosis- 433.10



    Treatment:  continue aspirin 81mg and start high-dose statin.



 



 Hypertension - 401.9



   - Aggresive BP control. Start low dose ACEI.  normalize BP.



 



 DVT prophylaxis -



   - SCDs & JAYCOB hose



   - Enoxaparin SQ



 



 I have talked the patient and available family in detail about the warning 
signs of stroke; the importance of their early recognition and activation of 
EMS. The stroke risk factors have also been clearl



 y identified and communicated to the patient. The importance of taking 
prescribed medication for secondary stroke prevention and being regular in 
follow up appointments has also been emphasized.



 



 Pawel Adler M.D.



 



 Dept of Neurology



 [phone] (pager)    [phone] (cell)

## 2018-07-17 NOTE — XMS REPORT
DOMINIQUE [facility]

 Created on:2018



Patient:Darin Garcia

Sex:Male

:1935

External Reference #:[id number]





Demographics







 Address  [address] [place] [address]

 

 Phone  [phone]

 

 Preferred Language  en

 

 Marital Status  Unknown

 

 Syn[employer] Affiliation  Unknown

 

 Race  Unreported/Refused to Report

 

 Ethnic Group  Refused to Report









Author







 Organization  [employer]









Care Team Providers







 Name  Role  Phone

 

 Marla Vogel  Provider Role  Unavailable









Allergies

No Known Allergies



Problems







 Problem Type  Condition  Code  Onset Dates  Condition Status

 

 Problem  Benign essential HTN  I10    Active

 

 Problem  History of cerebrovascular  Z86.73    Active



   accident      

 

 Problem  Anxiety  F41.9    Active

 

 Problem  Intracranial hemorrhage  I62.9    Active

 

 Problem  Depression  F32.9    Active

 

 Problem  Hyperlipidemia, mixed  E78.2    Active

 

 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

 

 Problem  Prostate cancer  C61    Active

 

 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

 

 Problem  Hx of pulmonary embolus  Z86.711    Active

 

 Problem  Traumatic subarachnoid hemorrhage  S06.6X0S    Active



   without loss of consciousness,      



   sequela      

 

 Problem  History of transient ischemic  Z86.73    Active



   attack      







Medications

No Known Medications



Results

No Known Results



Summary Purpose

[employer] Submission

## 2018-07-17 NOTE — XMS REPORT
DOMINIQUE [facility]

 Created on:2018



Patient:Darin Garcia

Sex:Male

:1935

External Reference #:[id number]





Demographics







 Address  [address] [place] [address]

 

 Phone  [phone]

 

 Preferred Language  en

 

 Marital Status  Unknown

 

 Sabianism Affiliation  Unknown

 

 Race  Unreported/Refused to Report

 

 Ethnic Group  Refused to Report









Author







 Organization  [employer]









Care Team Providers







 Name  Role  Phone

 

 RodriguezChato  Provider Role  Unavailable









Allergies

No Known Allergies



Problems







 Problem Type  Condition  Code  Onset Dates  Condition Status

 

 Problem  Hx of pulmonary embolus  Z86.711    Active

 

 Problem  History of transient ischemic  Z86.73    Active



   attack      

 

 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

 

 Assessment  Diarrhea, unspecified type  R19.7    Active

 

 Problem  History of cerebrovascular  Z86.73    Active



   accident      

 

 Problem  Benign essential HTN  I10    Active

 

 Problem  Depression  F32.9    Active

 

 Problem  Anxiety  F41.9    Active

 

 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

 

 Problem  Traumatic subarachnoid hemorrhage  S06.6X0S    Active



   without loss of consciousness,      



   sequela      

 

 Problem  Intracranial hemorrhage  I62.9    Active

 

 Problem  Hyperlipidemia, mixed  E78.2    Active







Medications







 Medication  Code System  Code  Instructions  Start Date  End Date  Status  
Dosage

 

 Zoloft  NDC  [id number]  100 MG Orally      Active  1 tablet



       Once a day        

 

 Aspir-81  NDC  [id number]  81 MG Orally Once      Active  1 tablet



       a day        

 

 Lipitor  NDC  [id number]  80 MG Orally Once      Active  1 tablet



       a day        







Results

No Known Results



Summary Purpose

[employer] Submission

## 2018-07-17 NOTE — XMS REPORT
Summary of Care: 2014 - 2014

 Created on:2051



Patient:ANA PEREZ

Sex:Male

:1935

External Reference #:[id number]





Demographics







 Address  [address] [place] [address]-

 

 Home Phone  [phone]

 

 Preferred Language  English

 

 Marital Status  

 

 Pentecostal Affiliation  Jain

 

 Race  White/

 

 Ethnic Group  Non-









Author







Encounter







 Dates  Location  Diagnoses  Discharge  Providers



       Disposition  

 

 2014 -  [facility]  Final: Personal History of Transient 
Ischemic Attack (TIA), and Cerebral Infarction without Residual Deficits  Home  
Pawel Adler



 2014  [address] [place] [address]      Pawel Adler



   [address]    , [place]  Final: Unspecified Transient Cerebral 
Ischemia    Kenneth Ellis



         



     Final: Unspecified Essential Hypertension    







Reason for Visit

TRANS-ISCHEMIC ATTACK



Vital Signs







 Most recent to oldest  1  2  3



 [Reference Range]:      

 

 Height  190.5 cm  190.5 cm  



   (2014 00:07:00 [facility])  [facility]
  

 

 Temperature Oral  97.9 DegF  98.4 DegF  96.5 DegF



 [96.4-99.1 DegF]  (2014 13:17:00 [facility])  (2014 08:53:00 
[facility])  (2014 04:32:00 [facility])

 

 Systolic Blood Pressure  137 mmHg  159 mmHg  121 mmHg



 [ mmHg]  (2014 19:25:00 [facility])  *HI*  (2014 18:00:
00 [facility])



     (2014 18:47:00 [facility])  

 

 Diastolic Blood Pressure  78 mmHg  87 mmHg  54 mmHg



 [60-90 mmHg]  (2014 19:25:00 [facility])  (2014 18:47:00 
[facility])  *LOW*



       (2014 18:00:00 [facility])

 

 Respiratory Rate [14-20  31 BRMIN  33 BRMIN  15 BRMIN



 BRMIN]  *HI*  *HI*  (2014 18:00:00 [facility])



   (2014 19:25:00 [facility])  (2014 18:47:00 [facility])
  

 

 Peripheral Pulse Rate  90 bpm  70 bpm  74 bpm



 [ bpm]  (2014 08:53:00 [facility])  (2014 04:32:00 
[facility])  (2014 00:17:00 [facility])

 

 Weight  90.909 kg  90.007 kg  



   (2014 00:07:00 [facility])  (2014 23:50:00 [facility])
  

 

 Body Mass Index  25.05 m2  24.8 m2  



   (2014 00:07:00 [facility])  (2014 23:50:00 [facility])
  







Problem List







 Condition  Effective Dates  Status  Health Status  Informant

 

 Acute pain(Confirmed)    Active    

 

 Prostate cancer(Confirmed)    Resolved    

 

 TIA (transient ischemic    Resolved    



 attack)(Confirmed)        







Allergies, Adverse Reactions, Alerts







 Substance  Reaction  Severity  Status

 

 NKDA      Active







Medications







 Medication  Instructions  Start Date  Stop Date  Status

 

 acetaminophen  650 mg, 2 tab, Route: PO, Drug form: TAB, Q4H, Dosing Weight 
90.909, kg, PRN Pain 1-3/Temp > 99.5 F, Start date: 14 5:46:00, Duration: 
30 day, Stop date: 14 5:45:00  2014  Discontinued



   Do not exceed 4 gm/day.  (Same as: Tylenol)      

 

 aspirin  325 mg, 1 tab, Route: PO, Drug form: TAB, Daily, Dosing Weight 90.909
, kg, Priority: NOW, Start date: 14 1:20:00, Duration: 30 day, Stop date: 
14 9:00:00  2014  Discontinued



   Take with food.      

 

 aspirin  81 mg, 1 tab, Route: PO, Drug form: ECTAB, Daily, Dosing Weight 90.909
, kg, Start date: 14 13:30:00, Duration: 30 day, Stop date: 14 9:00:
00  2014  Discontinued



   Do not crush or chew.(Same As: Ecotrin)      

 

 aspirin 81 mg tablet,  81 mg=1 tab, PO, Daily,  2014    Ordered



 enteric coated  # 30 tab, 0 Refill(s)      

 

 aspirin 81 mg tablet,  81 mg=1 tab, PO, Daily  2014  
Discontinued



 enteric coated        

 

 atorvastatin  80 mg, 1 tab, Route: PO, Drug form: TAB, Bedtime, Dosing Weight 
90.909, kg, Priority: NOW, Start date: 14 1:20:00, Duration: 30 day, Stop 
date: 14 21:00:00  2014  Discontinued



   Same as Lipitor      

 

 atorvastatin 80 mg oral  80 mg=1 tab, PO,  2014    Ordered



 tablet  Bedtime, # 30 tab, 0      



   Refill(s)      

 

 enoxaparin  40 mg, 0.4 mL, Route: SUB-Q, Drug form: INJ, [id number], Dosing 
Weight 90.909, kg, Start date: 14 6:00:00, Duration: 30 day, Stop date:  6:00:00  2014  Discontinued



   (Same as: Lovenox)      

 

 lisinopril  5 mg, 1 tab, Route: PO, Drug form: TAB, Daily, Dosing Weight 90.909
, kg, Start date: 14 9:00:00, Duration: 30 day, Stop date: 14 9:00:
00  2014  Canceled



   (Same as: Prinivil, Zestril)      

 

 lisinopril  40 mg, 2 tab, Route: PO, Drug form: TAB, Daily, Dosing Weight 
90.909, kg, Priority: NOW, Start date: 14 12:07:00, Duration: 30 day, 
Stop date: 14 9:00:00  2014  Discontinued



   (Same as: Prinivil, Zestril)      

 

 lisinopril 20 mg oral  40 mg=2 tab, PO, Daily,  2014  
Discontinued



 tablet  # 30 tab, 0 Refill(s)      

 

 lisinopril 5 mg oral  5 mg=1 tab, PO, Daily, #  2014    Ordered



 tablet  30 tab, 0 Refill(s)      

 

 multivitamin  1 tablet, PO, Daily, 0  2014    Ordered



   Refill(s)      

 

 multivitamin  1 tab, Route: PO, Drug Form: TAB, Dosing Weight 90.909, kg, Daily
, Start date: 14 9:00:00, Duration: 30 day, Stop date: 14 9:00:00  
2014  Canceled



   (Same as:Thera)  Take with food.      

 

 Ocuvite oral tablet  1 tab, PO, Daily, # 30  2014    Ordered



   tab, 0 Refill(s)      

 

 Omnipaque 350mg/ml  85 mL, Route: IVP, Drug Form: SOLN, Dosing Weight 90.909, 
kg, ONCALL, STAT, Start date: 14 6:27:00, Duration: 1 doses or times, Dose
=2.2ml/kg,  Max dose=100ml -- "To be infused by Radiology Staff ONLY"  2014  Completed



   Dose=2.2ml/kg,  Max dose=100ml -- "To be infused by Radiology Staff ONLY"   
   



   (Same as:Omnipaque 350).      

 

 Vitamin B12  1 tablet, PO, Daily, 0  2014    Ordered



   Refill(s)      

 

 Vitamin B12  1,000 microgram, 1 tab, Route: PO, Drug form: TAB, Daily, Dosing 
Weight 90.909, kg, Start date: 14 9:00:00, Duration: 30 day, Stop date:  9:00:00  2014  Canceled



   (Same As: Vitamin B-12)      

 

 Zoloft  100 mg, 1 tab, Route: PO, Drug form: TAB, Daily, Dosing Weight 90.909, 
kg, Start date: 14 14:00:00, Duration: 30 day, Stop date: 14 9:00:
00  2014  Discontinued



   (Same as: Zoloft)      

 

 Zoloft 100 mg oral tablet  100 mg=1 tab, PO, Daily  2014    Ordered







Results

ELECTROLYTES





 Most recent to oldest [Reference Range]:  1  2

 

 Sodium Lvl [135-145 mEq/L]  140 mEq/L  



   (2014 02:10:00 [facility])  

 

 Potassium Lvl [3.5-5.1 mEq/L]  4.1 mEq/L  



   (2014:10:00 [facility])  

 

 Chloride Lvl [ mEq/L]  106 mEq/L  



   (2014:10: [facility])  

 

 CO2 [24-32 mEq/L]  26 mEq/L  



   (2014:10: [facility])  

 

 AGAP [10.0-20.0 mEq/L]  12.1 mEq/L  



   (2014:10: [facility])  



CHEM PANEL





 Most recent to oldest [Reference Range]:  1  2

 

 Creatinine Lvl [0.5-1.4 mg/dL]  1.0 mg/dL  



   (2014:10: [facility])  

 

 eGFR  73 mL/min/1.73m2 1  



   *NA*  



   (2014:10:00 [facility])  

 

 BUN [7-22 mg/dL]  22 mg/dL  



   (2014:10: [facility])  

 

 Glucose Lvl [70-99 mg/dL]  112 mg/dL 2  



   *HI*  



   (2014:10:00 [facility])  

 

 Calcium Lvl [8.5-10.5 mg/dL]  8.3 mg/dL  



   *LOW*  



   (2014:10:00 [facility])  

 

 Phosphorus [2.5-4.5 mg/dL]  3.4 mg/dL  



   (2014:10:00 [facility])  

 

 Magnesium Lvl [1.8-2.4 mg/dL]  2.0 mg/dL  



   (2014:10: [facility])  



1Result Comment: The eGFR is calculated using the CKD-EPI formula. In most young
, healthy individualsthe eGFR will be >90 mL/min/1.73m2. The eGFR declines with 
age. An eGFR of 60-89 may be normal in some populations, particularly the 
elderly, for whom the CKD-EPI formula has not been extensively validated. Use 
of the eGFR is not recommended in the following populations:



Individuals with unstable creatinine concentrations, including pregnant 
patients and those with serious co-morbid conditions.



Patients with extremes in muscle mass or diet.



The data above are obtained from the National Kidney Disease Education Program (
NKDEP) which additionally recommends that when the eGFR is used in patients 
with extremes of body mass index for purposesof drug dosing, the eGFR should be 
multiplied by the estimated BMI.2Interpretive Data: Adult reference range 
values reflect the clinical guidelines

of the American Diabetes Association.CARDIAC ENZYMES





 Most recent to oldest [Reference  1  2



 Range]:    

 

 Total CK [ unit/L]  93 unit/L  58 unit/L



   (2014 15:21:39 [facility])  (2014 11:36:00 [facility])

 

 CK MB [0.5-3.6 ng/mL]  1.2 ng/mL  



   (2014 15:21:39 [facility])  

 

 CK MB Index [0.0-2.5]  1.3  



   (2014 15:21:39 [facility])  

 

 Troponin-T [0.000-0.100 ng/mL]  <0.010 ng/mL  



   (2014 11:36:00 [facility])  

 

 Troponin-I [0.00-0.40 ng/mL]  <0.02 ng/mL  



   (2014 11:36:00 [facility])  



LIPIDS





 Most recent to oldest [Reference Range]:  1  2

 

 CHD Risk [4.00-7.30]  7.17  



   (2014 02:10:00 [facility])  

 

 Chol [<=199 mg/dL]  165 mg/dL  



   (2014 02:10:00 [facility])  

 

 Trig [<=149 mg/dL]  118 mg/dL  



   (2014 02:10:00 [facility])  

 

 HDL [>=61 mg/dL]  23 mg/dL  



   *LOW*  



   (2014 02:10:00 [facility])  

 

 LDL (Calculated) [<=99 mg/dL]  118 mg/dL  



   *HI*  



   (2014 02:10:00 [facility])  



SPECIAL CHEMISTRY





 Most recent to oldest [Reference Range]:  1  2

 

 Hgb A1C [<=5.6 %]  5.6 %  



   (2014 02:10:00 [facility])  



URINE AND STOOL





 Most recent to oldest [Reference Range]:  1  2

 

 UA Turbidity [Clear]  Clear  



   (2014 11:35:03 [facility])  

 

 UA Color [Yellow]  Yellow  



   *NA*  



   (2014 11:35:03 [facility])  

 

 UA pH [5.0-8.0]  6.5  



   (2014 11:35:03 [facility])  

 

 UA Spec Grav [<=1.030]  >=1.050  



   *ABN*  



   (2014 11:35:03 [facility])  

 

 UA Glucose [Negative mg/dL]  Negative mg/dL  



   *NA*  



   (2014 11:35:03 [facility])  

 

 UA Blood [Negative]  Negative  



   (2014 11:35:03 [facility])  

 

 UA Ketones [Negative mg/dL]  Negative mg/dL  



   *NA*  



   (2014 11:35:03 [facility])  

 

 UA Protein [Negative mg/dL]  50 mg/dL  



   *ABN*  



   (2014 11:35:03 [facility])  

 

 UA Urobilinogen [0.1-1.0 mg/dL]  2.0 mg/dL  



   *HI*  



   (2014 11:35:03 [facility])  

 

 UA Bili [Negative]  Negative  



   *NA*  



   (2014 11:35:03 [facility])  

 

 UA Leuk Est [Negative]  Negative  



   (2014 11:35:03 [facility])  

 

 UA Nitrite [Negative]  Negative  



   (2014 11:35:03 [facility])  

 

 UA WBC [0-5 /HPF]  1 /HPF  



   (2014 11:35:03 [facility])  

 

 UA RBC [0-2 /HPF]  1 /HPF  



   (2014 11:35:03 [facility])  

 

 UA Sq Epi [Few /LPF]  Occasional /LPF  



   *NA*  



   (2014 11:35:03 [facility])  

 

 UA Renal Epi [<=0 /LPF]  1 /LPF  



   *HI*  



   (2014 11:35:03 [facility])  

 

 UA Mucus [None Seen /LPF]  Few /LPF  



   *NA*  



   (2014 11:35:03 [facility])  



HEMATOLOGY





 Most recent to oldest [Reference Range]:  1  2

 

 WBC [3.7-10.4 K/CMM]  9.1 K/CMM  



   (2014 02:10:00 [facility])  

 

 RBC [4.70-6.10 M/CMM]  5.06 M/CMM  



   (2014 02:10:00 [facility])  

 

 Hgb [14.0-18.0 g/dL]  15.6 g/dL  



   (2014:10:00 [facility])  

 

 Hct [42.0-54.0 %]  45.8 %  



   (2014 02:10:00 [facility])  

 

 MCV [80.0-94.0 fL]  90.6 fL  



   (2014 02:10:00 [facility])  

 

 MCH [27.0-31.0 pg]  30.8 pg  



   (2014:10:00 [facility])  

 

 MCHC [32.0-36.0 g/dL]  34.0 g/dL  



   (2014:10: [facility])  

 

 RDW [11.5-14.5 %]  14.3 %  



   (2014:10:00 [facility])  

 

 Platelet [133-450 K/CMM]  181 K/CMM  



   (2014:10:00 [facility])  

 

 MPV [7.4-10.4 fL]  9.0 fL  



   (2014 02:10:00 [facility])  

 

 Segs [45.0-75.0 %]  63.5 %  



   (2014:10:00 [facility])  

 

 Lymphocytes [20.0-40.0 %]  24.3 %  



   (2014 02:10:00 [facility])  

 

 Monocytes [2.0-12.0 %]  9.3 %  



   (2014:10:00 [facility])  

 

 Eosinophils [0.0-4.0 %]  2.6 %  



   (2014 02:10:00 [facility])  

 

 Basophils [0.0-1.0 %]  0.3 %  



   (2014:10:00 [facility])  

 

 Segs-Bands # [1.5-8.1 K/CMM]  5.8 K/CMM  



   (2014 02:10:00 [facility])  

 

 Lymphocytes # [1.0-5.5 K/CMM]  2.2 K/CMM  



   (2014 02:10:00 [facility])  

 

 Monocytes # [0.0-0.8 K/CMM]  0.9 K/CMM  



   *HI*  



   (2014 02:10:00 [facility])  

 

 Eosinophils # [0.0-0.5 K/CMM]  0.2 K/CMM  



   (2014 02:10:00 [facility])  







Medications Administered During Your Visit

No data available for this section



Immunizations

No data available for this section



Procedures







 Procedure Type  Body Site  Date of Procedure  Related Diagnosis

 

 Cholecystectomy      

 

 Hip replacement      







Social History







 Social History Type  Response







Assessment and Plan

Extracted from:





 Title: Clinical Document  Author: Nicol Pires  Date: 2014









 [facility] TRANSFER/DISCHARGE SUMMARY



 



 



 Date of Admission:



 14



 Date of Discharge/Transfer:



 14



 Admit Diagnosis:



 dizziness and LLE plegia



 Discharge Diagnosis:



 TIA



 Etiology of Stroke:



 n/a



 Brief HPI:



 Pt is a 79 yo R handed  with PMH significant for HTN and 2 TIAs who 
presents with acute onset of nausea, SOB, dizziness about 4 pm after carring a 
diswasher outside for a garage sale.  He produ



 michael emesis x 3, became aphasic..  Per EMS he had LLE plegia.  He denied HA 
during this time and his returned to his baseline by the time he reached the 
OSH.



 Testing/Labs: EKG from OSH: NSR w/ 1st degree AV Block; WBC: 14.7, Neutrophilia
: 85.6  Glucose: 140  Creatinine: 1.28



 He did take aspirin on a daily basis however stopped for the past week 2/2 
easy bruising of his hands.



 His previous TIAs occured in  both presented with RUE weakness, at 
that time he was found to have a carotid us showing L ICA stenosis of 50%.



 Pt did not get tPA since his symptoms rapidally improved and he was outside 
the window.



 Hospital Course:



 MRI negative for acute stroke but reveals chronic microhemorrhages. Patient 
deemed stable for discharge home.



 Discharge Physical Examination:



 AAO3, no facial asymetry, no motor deficits, no sensory deficits, no dysmetria
, no gait abnormalities



 Discharge Medications:



 see HMR



 Disposition:  HOME



 Follow up:



 

 Stroke Fellow Clinic within 4-6 weeks, call 



 



 

 Discharge Instructions:



 

 The patient received stroke education regarding signs and symptoms of stroke.
  They were instructed to call 911 if similar symptoms occurred again.  The 
list of their medications on discharged was r



 mariewed with the patient and all questions were answered.





Extracted from:





 Title: Neurology  Author: Anna Diop  Date: 2014









 Neurology



 Patient Name: Ana Perez



 MRN: [id number]



 Date of Admission: 14



 



 Requesting Physician/Service: OSH Transfer



 



 CC: Nauseous



 



 HISTORY OF PRESENT ILLNESS:



 Pt is a 79 yo R handed  with PMH significant for HTN and 2 TIAs who 
presents with acute onset of nausea, SOB, dizziness about 4 pm after carring a 
diswasher outside for a garage sale.  He produ



 michael emesis x 3, became aphasic..  Per EMS he had LLE plegia.  He denied HA 
during this time and his returned to his baseline by the time he reached the 
OSH.



 Testing/Labs: EKG from OSH: NSR w/ 1st degree AV Block; WBC: 14.7, Neutrophilia
: 85.6  Glucose: 140  Creatinine: 1.28



 He did take aspirin on a daily basis however stopped for the past week 2/2 
easy bruising of his hands.



 His previous TIAs occured in  both presented with RUE weakness, at 
that time he was found to have a carotid us showing L ICA stenosis of 50%.



 Pt did not get tPA since his symptoms rapidally improved and he was outside 
the window.



 



 REVIEW OF SYSTEMS:



 GEN: no fever, chills, weight loss, fatigue



 EYES: no blurred vision, double vision



 CARDIO: no chest pain, palpitations



 PULM: no shortness of breath, cough



 GI: no nausea, vomiting, diarrhea, no abd pain, no constipation



 : no frequency, dysuria, burning, hematuria



 NEURO: see HPI



 SKIN: no rash or lesion



 ENDOCRINE: cold/heat intolerance



 MUSCULOSKELETAL: No joint pain



 



 PAST MEDICAL HISTORY



 HTN



 PAST SURGICAL HISTORY



 Prostate, L Hip Replacement, Cholecystectomy, Dental procedures 2/2 impacted 
tooth



 FAMILY MEDICAL HISTORY



 unknown



 SOCIAL HISTORY



 Pt is , lives with wife.  Smoked briefly in college, drinks a glass of 
red wine daily for health benefits, denies drug use.



 



 HOME MEDS



 NONE



 



 ALLERGIES



 NKDA



 



 PHYSICAL EXAM



 



 Vitals and Temp:



 



 Vitals Tmp(F) Pulse BP RR SpO2 FIO2



  04:32 96.5 70 147/78 20 100 ---



  00:17 96.0 74 155/92 18 95 ---



 



 24 Hr Tmax: 96.5F (35.83c) at  04:32 Vital Signs are the last 5 in the 
past 48 hours.



 



 



 GENERAL: Awake, alert, NAD.



 HEENT: - Normocephalic and atraumatic; MMM



 LUNGS - Clear to auscultation bilaterally with no wheezes



 CV - S1S2 RRR, no m/r/g, equal pulses bilaterally, no carotid bruit



 ABDOMEN - Soft, nontender, non-distended with normoactive BS



 



 



 NEURO:



 1. Mental Status: Patient is awake alert, fully oriented to person, place and 
time.



 AAO x _3_ PERRL _2_mm brisk EOMI, tongue midline, no facial asymmetry



 2. Speech/language: Naming, Repetition, comprehension and fluency are intact.



 3. Cranial Nerves:



 EOMI, visual fields full, pupil 3 mm reactive bilaterally, facial sensation 
intact, face symmetric, hearing intact, tongue/uvula/soft palate midline, 
normal sternocleidomastoid and trapezius muscle stre



 ngth. No evidence of tongue atrophy or fibrillations



 4. Motor:



 Strength/Power:



 R UE- Deltoid 5/5, Triceps 5/5, Biceps 5/5, Wrist flexion 5/5, Wrist extension 
5/5



 L UE- Deltoid 5/5, Triceps 5/5, Biceps 5/5, Wrist flexion 5/5, Wrist extension 
5/5



 R LE- Illopsoas 5/5, Knee extension 5/5, Knee flexion 5/5, dorsiflexion 5/5, 
plantar flexion 5/5



 LLE- Illopsoas 5 /5, Knee extension5 /5, Knee flexion 5/5, dorsiflexion 5/5, 
plantar flexion 5/5



 Hemiparesis and/or hemiplegic



 Tone is normal



 Deep Tendon Reflexes-



 R Triceps2+, Biceps 2+, Brachioradialis 2+, Patellar 2+, Ankle 2+



 L Triceps2+, Biceps 2+, Brachioradialis 2+, Patellar 2+, Ankle 2+



 Toes down going bilaterally



 6. Sensation- intact to pinprick, temperature, vibration, and proprioception 
and equal bilaterally



 7. Coordination: FTN wnl, heel to shin WNL with no signs of dysmetria , no 
ataxia noted



 8. Gait-DEFERRED



 







 



 NIHSS   TOTAL:  ___0_____



 1a. Level of Consciousness



 __0__        0-alert  1-drowsy  2-stupor



 1b. LOC Questions   month and age



 _0___        0-both   1-one   2-neither



 1c. LOC Commands   open/ close the eyes and then to  and release the non-
paretic hand



 __0__        0-both   1-one   2-neither



 2. Best Gaze



 __0__        0-nl    1-partial    2-forced gaze



 3. Visual Fields



 _0___        0=No visual loss. 1=Partial hemianopia. 2=Complete hemianopia. 3=
Bilateral hemianopia



 4. Facial Palsy



 _0___        0-none  1-minor  2-partial    3-complete



 0=No drift; leg holds 30-degree position for full 5 seconds.  1=Drift; leg 
falls by the end of the 5-second period but does not hit bed.  2=Some effort vs 
gravity; leg falls by 5 seconds, some effort vs



  gravity.  3=No effort vs gravity; leg falls to bed immediately.  4=No 
movement.



 5-8. Motor



 __0__R. arm



 __0__L. arm



 _0___R. leg



 __0__L. leg



 



 9. Limb Ataxia



 _0___        0 absent    1 - 1limb    2 - 2 limbs



 10. Sensory



 _0___        0-nl      1-partial loss   2-dense loss



 11. Best Language



 _0___        0-nl     1-mild/mod    2-severe  3-mute



 12. Dysarthria



 __0__        0-nl     1-mild/mod    2-severe     x-untestable



 13. Extinction and Inattention (formerly Neglect):



 __0__        0-none   1-partial    2-complete



 



 SIGNIFICANT LABS:



 Creatinine: 1.0 ,WBC: 9.1  ,Glucose: 112



 



 DIAGNOSTIC TESTS:



 CT Head: no acute abnormalities observed, no hemorrhage observed on personal 
review.



 



 THE FOLLOWING WERE PRESENT ON ADMISSION (POA)



 Cardiovascular



      HTN



 EKG



 Metabolic



     Hyperglycemia



 



 



 ASSESSMENT:



 Pt is a 79 yo R handed  with PMH significant for HTN and 2 TIAs who 
presents with acute onset of nausea, SOB, dizziness about 4 pm after carring a 
diswasher outside for a garage sale. His sympt



 oms are concerning for possible posterior circulation stroke. However now his 
symptoms have resolved and he has returned to his baseline. It would best to 
perform a full stroke work up to provided the a



 dequate secondary stroke preventio for Mr. ePrez.



 



 



 PLAN



 



 # TIA:



 - Location: Possible Posterior Circulation



 - Etiology: Likely large artery vs cardioembolic



 - Admit to Stroke unit under Dr. Adler



 - BP goal : permissive HTN



 -  mg po daily



 - Lipitor 80 mg po daily, will order lipid panel and hepatic panel and adjust 
Lipitor accordingly



 - cerebral edema: No



 - will order A1c and start sliding scale insulin for tight glucose control



 - will get MRI of brain in the morning



 - will get 2D echo



 - PT/OT/ ST evaluation in the morning



 - DVT Prophylaxis: Lovenox 40mg q d, SCD, JAYCOB



 - GI Prophylaxis: Not indicated



 - Bowel Prophylaxis: Colace and Senna



 



 



 The patient was discussed with  __Valentin______, the attending/fellow on 
call.



 



 Anna Diop MD



 Neurology PGY-2



 MSO : [id number]



 Pager : [phone]



 



 [facility]



 



 I have seen and examined the patient.  Furthermore, I have discussed the case 
with and reviewed Dr. Diop's note and agree with the history, exam, assessment 
and plan.  See note below for additions and/



 or exceptions and my findings.  I have personally viewed the patient's 
radiographic studies and laboratory tests.



 



 79yo male with PMH s/f left carotid stenosis, TIAs (RUE sensory loss) who had 
acute onset N/V and difficulty speaking.  Pt was also found to have Left lower 
extremity weakness.  He returned to baseline



 and went to an OSH, then transferred here for a higher level of care.



 



 Notable Labs:  LDL: 118   HgbA1c: 5.6



 



 CT-head:  negative acute, no ICH.



 CT-angiogram head/neck:  no significant, flow-limiting stenoses or occlusions, 
but left ICA at bulb is 57% stenotic.



 MRI brain: no acute infarctions.  Severe old microhemorrhages in the bilateral 
occipital lobes.



 



 History of Carotid Stenosis - left ICA 57%.



 



 Transthoracic Echocardiogram:  pending



 



 Assessment / Plan:  12408



 



 TIA - 435.9



   - see below.



 



 Asymptomatic left Carotid stenosis- 433.10



    Treatment:  continue aspirin 81mg and start high-dose statin.



 



 Hypertension - 401.9



   - Aggresive BP control. Start low dose ACEI.  normalize BP.



 



 DVT prophylaxis -



   - SCDs & JAYCOB hose



   - Enoxaparin SQ



 



 I have talked the patient and available family in detail about the warning 
signs of stroke; the importance of their early recognition and activation of 
EMS. The stroke risk factors have also been clearl



 y identified and communicated to the patient. The importance of taking 
prescribed medication for secondary stroke prevention and being regular in 
follow up appointments has also been emphasized.



 



 Pawel Adler M.D.



 



 Dept of Neurology



 [phone] (pager)    [phone] (cell)

## 2018-07-17 NOTE — XMS REPORT
Summary of Care: 17 - 17

 Created on:2035



Patient:ANA PEREZ

Sex:Male

:1935

External Reference #:[id number]





Demographics







 Address  [address] [address]

 

 Home Phone  [phone]

 

 Email Address  NONE

 

 Preferred Language  English

 

 Marital Status  

 

 Sabianist Affiliation  Samaritan

 

 Race  Other

 

 Additional Race(s)  Other

 

 Ethnic Group  Not  or 









Author







 Organization  [facility]

 

 Address  [address] [place], [place] [address]-

 

 Phone  [phone]









Encounter

HQ Encntr_alias(FIN) [id number] Date(s): 17 - 17

[address] Professional Services provided by        
    [facility]       [place] [address]-  
   [phone]

Discharge Disposition: ED Registered In Error

Attending Physician: Rani Welch MD

Admitting Physician: Rani Welch MD

Referring Physician: Berlin May MD





Vital Signs

No data available for this section



Problem List







 Condition  Effective Dates  Status  Health Status  Informant

 

 Acute pain(Confirmed)    Active    

 

 CVA (cerebral vascular    Resolved    



 accident)(Confirmed)1        

 

 HLD (hyperlipidemia)(Confirmed)    Resolved    

 

 HTN (hypertension)(Confirmed)    Resolved    

 

 Prostate cancer(Confirmed)    Resolved    

 

 TIA (transient ischemic    Resolved    



 attack)(Confirmed)        



1R. sided defecits



Allergies, Adverse Reactions, Alerts







 Substance  Reaction  Severity  Status

 

 NKDA      Active







Medications

No data available for this section



Results

No data available for this section



Immunizations

Given and Recorded





 Vaccine  Date  Status  Refusal Reason

 

 pneumococcal 13-valent vaccine  16  Given  







Procedures







 Procedure  Date  Related Diagnosis  Body Site

 

 Cholecystectomy      

 

 Hip replacement      







Social History







 Social History Type  Response

 

 Substance Abuse  Use: None.

 

 Alcohol  Never

 

 Smoking Status  Never smoker; Type: Cigarettes; Exposure to Tobacco Smoke None;



   Cigarette Smoking Last 365 Days No; Reg Smoking Cessation



   Counseling No







Assessment and Plan

No data available for this section

## 2018-07-17 NOTE — XMS REPORT
Encounter CCD: 2012 to 2012

 Created on:2045



Patient:ANA PEREZ

Sex:Male

:1935

External Reference #:[id number]





Demographics







 Address  [address] [place] [address]-

 

 Home Phone  [phone]

 

 Preferred Language  Unknown

 

 Marital Status  

 

 Muslim Affiliation  Amish

 

 Race  White/

 

 Ethnic Group  Non-









Author







 Organization  [facility]









Care Team Providers







 Name  Role  Phone

 

 Mat Blanco  Referring Provider  [phone]









Allergies, Adverse Reactions, Alerts







 Substance  Reaction  Status

 

 NKDA    Active







Problem List







 Condition  Effective Dates  Status

 

 Acute pain    Active

## 2018-07-17 NOTE — XMS REPORT
DOMINIQUE [facility]

 Created on:2018



Patient:Darin Garcia

Sex:Male

:1935

External Reference #:[id number]





Demographics







 Address  [address] [place] [address]

 

 Phone  [phone]

 

 Preferred Language  en

 

 Marital Status  Unknown

 

 Gnosticism Affiliation  Unknown

 

 Race  Unreported/Refused to Report

 

 Ethnic Group  Refused to Report









Author







 Organization  [employer]









Care Team Providers







 Name  Role  Phone

 

 RodriguezChato  Provider Role  Unavailable









Allergies

No Known Allergies



Problems







 Problem Type  Condition  Code  Onset Dates  Condition Status

 

 Problem  Hx of pulmonary embolus  Z86.711    Active

 

 Problem  History of transient ischemic  Z86.73    Active



   attack      

 

 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

 

 Problem  Benign essential HTN  I10    Active

 

 Problem  Depression  F32.9    Active

 

 Problem  Anxiety  F41.9    Active

 

 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

 

 Problem  Traumatic subarachnoid hemorrhage  S06.6X0S    Active



   without loss of consciousness,      



   sequela      

 

 Problem  Intracranial hemorrhage  I62.9    Active

 

 Problem  Hyperlipidemia, mixed  E78.2    Active

 

 Assessment  History of cerebrovascular  Z86.73    Active



   accident      

 

 Assessment  Hyperlipidemia, mixed  E78.2    Active

 

 Assessment  Dysenteric diarrhea  A09    Active

 

 Problem  History of cerebrovascular  Z86.73    Active



   accident      







Medications







 Medication  Code System  Code  Instructions  Start  End Date  Status  Dosage



         Date      

 

 Lipitor  NDC  [id number]  80 MG Orally      Inactive  1 tablet



       Once a day        

 

 Zoloft  ND  50726944541  100 MG Orally      Active  1 tablet



       Once a day        

 

 Aspir-81  NDC  [id number]  81 MG Orally      Active  1 tablet



       Once a day        

 

 Repatha  NDC  [id number]  140 MG/ML  ,  Oct 04,  Active  1 ml



       Subcutaneous  2018    



       every 2 weeks        







Results

No Known Results



Summary Purpose

[employer] Submission

## 2018-07-17 NOTE — RAD REPORT
EXAM DESCRIPTION:  CT - Chest For Pe Angio - 7/17/2018 11:39 am

 

CLINICAL HISTORY:   Chest pain, back pain, weakness

 

COMPARISON:  Portable chest July 17, 2018, CT chest February 2016

 

TECHNIQUE:  Dynamically enhanced 3 mm thick images of the chest were obtained during administration o
f approximately 150mL Isovue 370 IV contrast. Coronal and oblique MIP reconstruction images were gene
rated and reviewed. Exam utilizes a protocol to evaluate the pulmonary arterial tree.

 

All CT scans are performed using dose optimization technique as appropriate and may include automated
 exposure control or mA/KV adjustment according to patient size.

 

FINDINGS:  Extensive pulmonary embolic disease is present filling the distal aspect of the right main
stem bronchus. This extends into right upper lobar and segmental branches. There is further extension
 of thrombus partially occlusive of the right middle lobe pulmonary artery. Right lower lobar and seg
mental branch pulmonary emboli are present. Airspace opacification is present in post oral lateral mi
d and upper right lower lobe. This is in the vascular distribution of the thrombosed pulmonary arteri
es. This is probably pulmonary hemorrhage.

 

No left upper lobe pulmonary emboli confirmed. Small focus of embolism present an 80 segmental branch
 artery of the left lower lobe and subsegmental branches in the medial left base. No alveolar hemorrh
age on the left.

 

The aorta as imaged shows no acute or suspicious finding. No pericardial thickening or effusion.

 

No suspicious soft tissue mass the lung parenchyma. No pleural effusion or pleural thickening.

 

No mediastinal or hilar suspicious masses. No chest wall masses or abnormal axillary lymphadenopathy.


 

Findings telephoned to Dr. Turcios 11:47 a.m..

 

IMPRESSION:  Extensive pulmonary embolic disease filling the distal aspect of the right main pulmonar
y artery and extending into lobar and segmental branches of the right upper, middle and lower lobes.

 

Minimal pulmonary embolic disease seen in segmental and subsegmental branches of the left lower lobe.


 

There is a moderate area of airspace opacification in the mid and upper right lower lobe in the distr
ibution of the thrombosed pulmonary arteries. This is most likely pulmonary hemorrhage.

## 2018-07-17 NOTE — EKG
Test Date:    2018-07-17               Test Time:    10:23:05

Technician:   ELIAS                                    

                                                     

MEASUREMENT RESULTS:                                       

Intervals:                                           

Rate:         70                                     

OR:           202                                    

QRSD:         92                                     

QT:           386                                    

QTc:          416                                    

Axis:                                                

P:            50                                     

OR:           202                                    

QRS:          -57                                    

T:            35                                     

                                                     

INTERPRETIVE STATEMENTS:                                       

                                                     

Normal sinus rhythm

Left axis deviation

Pulmonary disease pattern

Abnormal ECG

Compared to ECG 12/04/2017 16:22:35

First degree AV block no longer present



Electronically Signed On 07-17-18 14:50:16 CDT by Todd Vasquez

## 2018-07-17 NOTE — ECHO
HEIGHT: 6 ft 3 in   WEIGHT: 185 lb 0 oz   DATE OF STUDY: 07/17/2018   REFER DR: Gabriel Turcios MD

2-DIMENSIONAL: YES

     M.MODE: YES

 DOPPLER: YES

COLOR FLOW: YES



                    TDS:  NO

PORTABLE: NO

 DEFINITY:  NO

BUBBLE STUDY: NO





DIAGNOSIS:  PE



CARDIAC HISTORY:  

CATHERIZATION: NO

SURGERY: NO

PROSTHETIC VALVE: NO

PACEMAKER: NO





MEASUREMENTS (cm)

    DIASTOLIC (NORMALS)                 SYSTOLIC (NORMALS)

IVSd                 1.1 (0.6-1.2)                    LA Diam 3.1 (1.9-4.0)     LVEF       
  63%  

LVIDd               4.2 (3.5-5.7)                        LVIDs      2.8 (2.0-3.5)     %FS  
        34%

LVPWd             1.3 (0.6-1.2)

Ao Diam           2.8 (2.0-3.7)



2 DIMENSIONAL ASSESSMENT:

RIGHT ATRIUM:                   NORMAL

LEFT ATRIUM:       NORMAL



RIGHT VENTRICLE:            NORMAL

LEFT VENTRICLE: NORMAL



TRICUSPID VALVE:             NORMAL

MITRAL VALVE:     NORMAL



PULMONIC VALVE:             NORMAL

AORTIC VALVE:     NORMAL



PERICARDIAL EFFUSION: NONE

AORTIC ROOT:      NORMAL





LEFT VENTRICULAR WALL MOTION:     NORMAL



DOPPLER/COLOR FLOW:     NORMAL



COMMENTS:      NORMAL 2D ECHOCARDIOGRAM WITH DOPPLER. NO PULMONARY HYPERTENSION. NO 
EFFUSION. NO WALL MOTION ABNORMALITY.



TECHNOLOGIST:   LETITIA CORONA

## 2018-07-17 NOTE — XMS REPORT
Continuity of Care Document

 Created on:2017



Patient:ANA PEREZ

Sex:Male

:1935

External Reference #:[id number]





Demographics







 Address  [address] [address]

 

 Phone  [phone]

 

 Preferred Language  Unknown

 

 Marital Status  Unknown

 

 Scientologist Affiliation  Unknown

 

 Race  Unknown

 

 Ethnic Group  Unknown









Author







 Organization  [facility]









Problems







 Problem  Status  Onset  Classification  Date  Comments  Source



     Date    Reported    



             



             



             

 

 PARITAL LOBE  Active  20        [facility]



 CONTESIUM/ HAND    [address] [place] [address]



             



             

 

 CLOSED CEREBRAL  Active  20        [facility]



 CONTUSION    [address] [place] [address]



             



             

 

 RIGHT PARIETAL LOBE  Active  20        [facility]



 CONTUSION    [address] [place] [address]



             Center



             



             

 

 FALL  Active  20        [address]



             



             

 

 TSAH  Active  20        [address]



             



             

 

 Final: Unspecified    20    [facility]



 Transient Cerebral    14        Medical



 Ischemia            Center



             



             

 

 TRANS-ISCHEMIC  Active  20        [facility]



 ATTACK    [address] [place] [address]



             



             

 

 DDC-EGD  Active  20        [address]



             



             

 

 DDC/VARICIES  Active  20        [address]



             



             

 

 PORTAL VEIN  Active  20        [facility]



 TROMBOSIS    [address] [place] [address]



             



             

 

 PORTAL VEIN  Active  20        [facility]



 THROMBOSIS    [address] [place] [address]



             



             

 

 HOSPITAL F/U  Active  20        [address]



             



             

 

 FINGER FX  Active  20        [address]



             



             

 

 INTRAPARENCHYMAL  Active  20        [facility]



 HEMORRHAGE/FX TO    [address] [place] [address]



 FINGER            [facility]



             



             

 

 Acute pain  Active    Problem  2012     DAVID Ventura



             [facility]



             



             

 

 Acute pain  Active    Problem  2017    [facility]



             



             

 

 CVA (<span  Resolved    Problem  2017  R. sided   [place]



 ID="[id number]">C          defecits  Medical



 onfirmed</span>)<            Center



 p>1</sup>            



             

 

 HLD (<span  Resolved    Problem  2017     [place]



 ID="[id number]">C            Medical



 onfirmed</span>)            [facility]



             



             

 

 HTN (<span  Resolved    Problem  2017     [place]



 ID="[id number]">C            Medical



 onfirmed</span>)            [facility]



             



             

 

 Prostate cancer  Resolved    Problem  2017    [facility]



             



             

 

 TIA (<span  Resolved    Problem  2017     [place]



 ID="[id number]">Co            Medical



 nfirmed</span>)            Center



             



             

 

 Final: Personal        2014    [facility]



 History of            Medical



 Transient Ischemic            Center



 Attack , and            



 Cerebral Infarction            



 without Residual            



 Deficits            



             

 

 Final: Unspecified        2014    [facility]



 Essential            Medical



 Hypertension            Center



             



             

 

 Prostate cancer  Resolved    Problem  2014    [facility]



             



             

 

 FINGER INJURY NOS  Active          [facility]



             



             

 

 PORTAL VEIN  Active          [facility]



 THROMBOSIS            [facility]



             



             

 

 ROUTINE MEDICAL  Active          [facility]



 EXAM            [facility]



             



             

 

 VARICES OF OTHER  Active          [facility] [place]



 SITES            Medical



             [facility]



             



             

 

 ISCHEMIC OPTIC  Active          [facility]



 NEUROPTHY            [facility]



             



             

 

 TRAUM SUBRAC HEM W  Active          [facility]



 LOC OF [facility]            Medical



 DURATION,            [facility] [place]



             



             

 

 CONTUS/LAC CEREB, W  Active          [facility]            Medical



 DURATION            Center



             



             







Medications







 Medication  Details  Route  Status  Patient  Ordering  Order  Source



         Instructions  Provider  Date  



               



               



               

 

 heparin sodium,  5,000 unit, 1    No Longer        [facility]



 porcine 2500  mL, Route:    Active      017  Medical



 UNT/ML  SUB-Q, Drug            Center



 Injectable  form: INJ,            



 Solution  Q8H, Dosing            



   Weight 81.818,            



   kg, Start            



   date: 17            



   8:00:00 CST,            



   Duration: 30            



   day, Stop            



   date: 18            



   0:00:00            



   CSTNotes:            



   porcine            



   heparin            



               



               

 

 atorvastatin  40 mg, 1 tab,    Inactive        [facility]



   Route: PO,          017  Medical



   Drug form:            Center



   TAB, Bedtime,            



   Dosing Weight            



   84.091, kg,            



   Start date:            



   17            



   21:00:00 CST,            



   Duration: 30            



   day, Stop            



   date: 18            



   21:00:00            



   CSTNotes:            



   (Same as:            



   Lipitor)            



               



               

 

 Levetiracetam  500 mg=1 tab,    Active        [facility]



 500 MG Oral  PO, Q12H, # 12          017  Medical



 Tablet  tab, 0            Center



   Refill(s)            



               



               

 

 Saline Flush  10 ml, Route:    Inactive        [facility] [place]



 0.9%  IVP, Drug          017  Medical



   Form: INJ,            Center



   Dosing Weight            



   84.091, kg,            



   Q12H, Start            



   date: 17            



   9:00:00 CST,            



   Duration: 30            



   day, Stop            



   date: 18            



   21:00:00            



   CSTNotes: Same            



   as: BD            



   Posiflush            



   Sterile            



               



               

 

 Levetiracetam  500 mg, 1 tab,    Inactive        [facility]



   Route: PO,          017  Medical



   Drug form:            Center



   TAB, Q12H,            



   Dosing Weight            



   84.091, kg,            



   Start date:            



   17            



   9:00:00 CST,            



   Duration: 30            



   day, Stop            



   date: 18            



   21:00:00            



   CSTNotes:            



   (Same            



   as:Keppra)            



               



               

 

 sennosides, USP  8.6 mg, 1 tab,    Inactive        [facility]



   Route: PO,          017  Medical



   Drug Form:            Center



   TAB, Dosing            



   Weight 84.091,            



   kg, Q12H,            



   Start date:            



   17            



   9:00:00 CST,            



   Duration: 30            



   day, Stop            



   date: 18            



   21:00:00            



   CSTNotes:            



   (Same as:            



   Senokot)            



               



               

 

 Docusate  100 mg, 1 cap,    Inactive        [facility]



   Route: PO,          017  Medical



   Drug form:            Center



   CAP, Q12H,            



   Dosing Weight            



   84.091, kg,            



   Start date:            



   17            



   9:00:00 CST,            



   Duration: 30            



   day, Stop            



   date: 18            



   21:00:00            



   CSTNotes:            



   (Same as:            



   Colace) (Do            



   Not Crush)            



               



               

 

 Lisinopril  5 mg, 1 tab,    Inactive        [facility]



   Route: PO,          017  Medical



   Drug form:            Center



   TAB, Daily,            



   Dosing Weight            



   84.091, kg,            



   Start date:            



   17            



   9:00:00 CST,            



   Duration: 30            



   day, Stop            



   date: 18            



   9:00:00            



   CSTNotes:            



   (Same as:            



   Prinivil,            



   Zestril)            



               



               

 

 Sertraline  100 mg, 1 tab,    Inactive        [facility]



   Route: PO,          017  Medical



   Drug form:            Center



   TAB, Daily,            



   Dosing Weight            



   84.091, kg,            



   Start date:            



   17            



   9:00:00 CST,            



   Duration: 30            



   day, Stop            



   date: 18            



   9:00:00            



   CSTNotes:            



   (Same as:            



   Zoloft)            



               



               

 

 carvedilol  6.25 mg, 1    Inactive        [facility]



   tab, Route:          017  Medical



   PO, Drug form:            Center



   TAB, BID,            



   Dosing Weight            



   84.091, kg,            



   Start date:            



   17            



   9:00:00 CST,            



   Duration: 30            



   day, Stop            



   date: 18            



   17:00:00            



   CSTNotes: Give            



   with food.            



   (Same As:            



   Coreg)            



               

 

 Hydralazine  10 mg, 0.5 mL,    Inactive        [facility]



   Route: IV,          [facility]  Medical



   Drug form:            Center



   INJ, Q6H,            



   Dosing Weight            



   84.091, kg,            



   PRN            



   Hypertension,            



   Start date:            



   17            



   0:55:00 CST,            



   Duration: 30            



   day, Stop            



   date: 18            



   0:54:00            



   CSTNotes:            



   (Same as:            



   Apresoline)            



   Push over 5            



   minutes            



               



               

 

 Labetalol  10 mg, 2 mL,    Inactive        [facility]



   Route: IVP,          [facility]  Medical



   Drug form:            Center



   INJ, Q15Min,            



   Dosing Weight            



   84.091, kg,            



   PRN            



   Hypertension,            



   Start date:            



   17            



   0:55:00 CST,            



   Duration: 30            



   day, Stop            



   date: 18            



   0:54:00 CST            



               



               

 

 Dextrose 50%  12.5 gm, 25    Inactive        [facility]



 Syringe  mL, Route:          [address] [place] [address]



   IVP, Drug            [facility]



   Form: INJ,            



   Dosing Weight            



   84.091, kg,            



   PRN, PRN            



   Abnormal Lab            



   Result, Start            



   date: 17            



   0:53:00 CST,            



   Duration: 30            



   day, Stop            



   date: 18            



   0:52:00 CST            



               



               

 

 Regular  3 unit, 0.03    Inactive        [facility] [place]



 Insulin, Human  mL, Route:          [address] [place] [address]



 100 UNT/ML  SUB-Q, [facility]



 Injectable  form: SOLN,            



 Solution  PRN, Dosing            



   Weight 84.091,            



   kg, PRN            



   Abnormal Lab            



   Result, Start            



   date: 17            



   0:53:00 CST,            



   Duration: 30            



   day, Stop            



   date: 18            



   0:52:00            



   CSTNotes:            



   (Same as:            



   Humulin R)            



   Roll in palms            



   of hands            



   gently;  Do            



   not shake            



   vigorously.            



   "single            



   patient use            



   only&quot;            



   (Restricted to            



   patients            



   requiring a            



   dose >  60            



   units)  WASTE:            



   F/P - Black; E            



   - [facility]            



   Trash Bin            



   Stable for 28            



   days at room            



   temperature            



   Expires in            



   ______ days            



   from            



   ______________            



   Date            



               

 

 Saline Flush  10 ml, Route:    Inactive        [facility] [place]



 0.9%  IVP, Drug          [facility]  Medical



   Form: INJ,            Center



   Dosing Weight            



   84.091, kg,            



   PRN, PRN Line            



   Flush, Start            



   date: 17            



   0:53:00 CST,            



   Duration: 30            



   day, Stop            



   date: 18            



   0:52:00            



   CSTNotes: Same            



   as: BD            



   Posiflush            



   Sterile            



               



               

 

 Levetiracetam  1,000 mg,    Inactive        [facility]



   Route: IVPB,          017  Medical



   ONCE, Dosing            Center



   Weight 84.091,            



   kg, Start            



   date: 17            



   0:53:00 CST,            



   Stop date:            



   17            



   0:53:00            



   CSTNotes: Same            



   as Keppra  Mix            



   with 100 mL            



   NS, LR or D5W            



    ***            



   MEDICATION            



   WASTE ***            



   Product Size:            



   500 mg Product            



   Wasted:  ___            



   mg            



               

 

 Ondansetron  4 mg, 2 mL,    Inactive        [facility]



   Route: IVP,          017  Medical



   Drug form:            Center



   INJ, Q8H,            



   Dosing Weight            



   84.091, kg,            



   PRN Nausea &            



   Vomiting,            



   Start date:            



   17            



   0:53:00 CST,            



   Duration: 30            



   day, Stop            



   date: 18            



   0:52:00            



   CSTNotes:            



   (Same as:            



   Zofran)   ***            



   MEDICATION            



   WASTE ***            



   Product Size:            



   4 mg Product            



   Wasted:  ___            



   mg            



               

 

 Sodium Chloride  1,000 mL,    Inactive        [facility] [place]



 0.9% IV 1,000  Rate: 100          017  Medical



 mL  ml/hr, Infuse            Center



   over: 10 hr,            



   Route: IV,            



   Dosing Weight            



   84.091 kg,            



   Total Volume:            



   1,000, Start            



   date: 17            



   0:53:00 CST,            



   Duration: 30            



   day, Stop            



   date: 18            



   0:52:00 CST,            



   2.11, m2            



               



               

 

 Acetaminophen  1 tab, Route:    Inactive         [place]



 325 MG /  PO, Drug Form:          017  Medical



 Hydrocodone  TAB, Dosing            Center



 Bitartrate 5 MG  Weight 84.091,            



 Oral Tablet  kg, Q4H, PRN            



   Pain Score            



   1-3, Start            



   date: 17            



   0:53:00 CST,            



   Duration: 30            



   day, Stop            



   date: 18            



   0:52:00            



   CSTNotes:            



   (Same as:            



   Norco 325/5)            



   Do not exceed            



   4gm/day of            



   acetaminophen.            



               



               

 

 Levetiracetam  500 mg=1 tab,    Active         [place]



 500 MG Oral  PO, Q12H, # 12          016  Medical



 Tablet  tab, 0            Center



   Refill(s)            



               



               

 

 Aspirin 81 MG  81 mg=1 tab,    Active        [facility]



 Enteric Coated  PO, Daily, #          016  Medical



 Tablet  30 tab, 3            Center



 [Miniprin]  Refill(s)            



               



               

 

 carvedilol 6.25  6.25 mg=1 tab,    Active        [facility] [place]



 mg oral tablet  PO, BID, # 180          016  Medical



   tab, 0            Center



   Refill(s)            



               



               

 

 sertraline 100  100 mg=1 tab,    Active        [facility] [place]



 mg oral tablet  PO, Daily, #          016  Medical



   30 tab            Center



               



               

 

 atorvastatin 40  40 mg=1 tab,    Active        [facility] [place]



 mg oral tablet  PO, Bedtime, #          016  Medical



   30 tab            [facility]



               



               

 

 lisinopril 5 mg  5 mg=1 tab,    Active        [facility]



 oral tablet  PO, Daily, #          016  Medical



   30 tab            [facility]



               



               

 

 Saline Flush  10 ml, Route:    No Longer        [facility] [place]



 0.9%  IVP, Drug    Active      016  Medical



   Form: INJ,            [facility]



   Dosing Weight            



   85.455, kg,            



   Q12H, Start            



   date: 16            



   9:00:00 CST,            



   Duration: 30            



   day, Stop            



   date: 17            



   21:00:00            



   CSTNotes: Same            



   as: BD            



   Posiflush            



   Sterile            



               



               

 

 sennosides, USP  8.6 mg, 1 tab,    No Longer        [facility]



   Route: PO,    Active      016  Medical



   Drug Form:            Center



   TAB, Dosing            



   Weight 85.455,            



   kg, Q12H,            



   Start date:            



   16            



   9:00:00 CST,            



   Duration: 30            



   day, Stop            



   date: 17            



   21:00:00            



   CSTNotes:            



   (Same as:            



   Senokot)            



               



               

 

 Levetiracetam  500 mg, 1 tab,    No Longer        [facility]



   Route: PO,    Active      016  Medical



   Drug form:            [facility] [place]



   TAB, Q12H,            



   Dosing Weight            



   85.455, kg,            



   Start date:            



   16            



   9:00:00 CST,            



   Duration: 7            



   day, Stop            



   date: 17            



   21:00:00            



   CSTNotes:            



   (Same            



   as:Keppra)            



               



               

 

 Docusate Sodium  100 mg, 1 cap,    No Longer         [place]



 100 MG Oral  Route: PO,    Active      016  Medical



 Capsule  Drug form:            [facility]



   CAP, Q12H,            



   Dosing Weight            



   85.455, kg,            



   Start date:            



   16            



   9:00:00 CST,            



   Duration: 30            



   day, Stop            



   date: 17            



   21:00:00            



   CSTNotes:            



   (Same as:            



   Colace) (Do            



   Not Crush)            



               



               

 

 Famotidine  20 mg, 2 mL,    Inactive         [place]



   Route: IVP,          016  Medical



   Drug form:            [facility]



   INJ, Q12H,            



   Dosing Weight            



   85.455, kg,            



   Start date:            



   16            



   9:00:00 CST,            



   Duration: 30            



   day, Stop            



   date: 17            



   21:00:00            



   CSTNotes:            



   (Same as:            



   Pepcid) Can be            



   dilute in            



   5-10cc NS            



   IVP: Slow IV            



   push over at            



   least 2            



   minutes.            



               



               

 

 Streptococcus  0.5 mL, Route:    Inactive         [place]



 pneumoniae  IM, Drug Form:          [facility]



 serotype 1  INJ, Daily,            Center



 capsular  Start date:            



 antigen  16            



 diphtheria  9:00:00 CST,            



 [id number] protein  Duration: 1            



 conjugate  doses or            



 vaccine /  times, Stop            



 Streptococcus  date: 16            



 pneumoniae  9:00:00            



 serotype 14  CSTNotes:            



 capsular  Lightly roll            



 antigen  vial (DO NOT            



 diphtheria  SHAKE) before            



 [id number] protein  administration            



 conjugate  .  (Same as:            



 vaccine /  Prevnar 13)            



 Streptococcus              



 pneumoniae              



 serotype 18C              



 capsular              



 antigen d              



               



               

 

 tramadol  50 mg, 1 tab,    No Longer         [place]



 hydrochloride  Route: PO,    Active      016  Medical



 50 MG Oral  Drug form:            [facility]



 Tablet  TAB, Q6H,            



   Dosing Weight            



   85.455, kg,            



   PRN Pain Score            



   4-6, Start            



   date: 16            



   5:32:00 CST,            



   Duration: 30            



   day, Stop            



   date: 17            



   5:31:00            



   CSTNotes: Not            



   to exceed            



   400mg/day.            



   (Same As:            



   Ultram)            



               



               

 

 Insulin regular  4 unit, 0.04    No Longer         [place]



   mL, Route:    Active      016  Medical



   SUB-Q, Drug            Center



   form: SOLN,            



   Sliding Scale,            



   Dosing Weight            



   85.455, kg,            



   PRN Blood            



   Glucose            



   Results, Start            



   date: 16            



   2:06:00 CST,            



   Duration: 30            



   day, Stop            



   date: 17            



   2:05:00            



   CSTNotes:            



   (Same as:            



   Humulin R)            



   Roll in palms            



   of hands            



   gently;  Do            



   not shake            



   vigorously.            



   "single            



   patient use            



   only"            



   (Restricted to            



   patients            



   requiring a            



   dose >  60            



   units)  WASTE:            



   F/P - Black; E            



   - Municipal            



   Trash Bin            



   Stable for 28            



   days at room            



   temperature            



   Expires in            



   ______ days            



   from            



   ______________            



   Date            



               

 

 Saline Flush  10 ml, Route:    No Longer        [facility] [place]



 0.9%  IVP, Drug    Active      016  Medical



   Form: INJ,            Center



   Dosing Weight            



   85.455, kg,            



   PRN, PRN Line            



   Flush, Start            



   date: 16            



   2:06:00 CST,            



   Duration: 30            



   day, Stop            



   date: 17            



   2:05:00            



   CSTNotes: Same            



   as: BD            



   Posiflush            



   Sterile            



               



               

 

 Labetalol  10 mg, 2 mL,    No Longer        [facility]



   Route: IVP,    Active      016  Medical



   Drug form:            Center



   INJ, Q15Min,            



   Dosing Weight            



   85.455, kg,            



   PRN            



   Hypertension,            



   Start date:            



   16            



   2:06:00 CST,            



   Duration: 3            



   doses or            



   times, Stop            



   date: Limited            



   # of times            



               



               

 

 Ondansetron  4 mg, 2 mL,    No Longer        [facility] [facility]



   Route: IVP,    Active      016  [facility]



   Drug form:            Center



   INJ, Q8H,            



   Dosing Weight            



   85.455, kg,            



   PRN Nausea &            



   Vomiting,            



   Start date:            



   16            



   2:06:00 CST,            



   Duration: 30            



   day, Stop            



   date: 17            



   2:05:00            



   CSTNotes:            



   (Same as:            



   Zofran)   ***            



   MEDICATION            



   WASTE ***            



   Product Size:            



   4 mg Product            



   Wasted:  ___            



   mg            



               

 

 Morphine  1 mg, 0.5 mL,    Inactive        [facility]



   Route: IVP,          016  [facility]



   Drug form:            Center



   INJ, Q4H,            



   Dosing Weight            



   85.455, kg,            



   PRN Pain Score            



   7-10, Start            



   date: 16            



   2:06:00 CST,            



   Duration: 30            



   day, Stop            



   date: 17            



   2:05:00            



   CSTNotes:            



   (Same            



   as:MORPhine            



   Sulfate)            



               



               

 

 Bisacodyl  10 mg, 1 supp,    No Longer        [facility]



   Route: PA,    Active      016  [facility]



   Drug form:            Center



   SUPP, Daily,            



   Dosing Weight            



   85.455, kg,            



   PRN            



   Constipation,            



   Start date:            



   16            



   2:06:00 CST,            



   Duration: 30            



   day, Stop            



   date: 17            



   2:05:00            



   CSTNotes:            



   (Same As:            



   Dulcolax,            



   Bisco-Lax)            



               



               

 

 Acetaminophen  1 tab, Route:    Inactive        [facility]



 325 MG /  PO, Drug Form:          016  [facility]



 Hydrocodone  TAB, Dosing            Center



 Bitartrate 5 MG  Weight 85.455,            



 Oral Tablet  kg, Q6H, PRN            



 [Norco 5/325]  Pain Score            



   4-6, STAT,            



   Start date:            



   16            



   2:04:00 CST,            



   Duration: 30            



   day, Stop            



   date: 17            



   2:03:00            



   CSTNotes:            



   (Same as:            



   Norco 325/5)            



   Do not exceed            



   4gm/day of            



   acetaminophen.            



               



               

 

 Tylenol  650 mg, 2 tab,    No Longer        [facility]



   Route: PO,    Active      [facility]



   Drug form:            [facility]



   TAB, Q6H,            



   Dosing Weight            



   85.455, kg,            



   PRN Pain            



   1-3/Temp >            



   100.4 F,            



   Priority:            



   STAT, Start            



   date: 16            



   2:03:00 CST,            



   Duration: 30            



   day, Stop            



   date: 17            



   2:02:00            



   CSTNotes: Do            



   not exceed 4            



   gm/day.  (Same            



   as: Tylenol)            



               



               

 

 Keppra  1,000 mg,    Inactive        [facility]



   Route: IV,          016  [facility]



   ONCE, Dosing            Center



   Weight 85.455,            



   kg, Priority:            



   STAT, Start            



   date: 16            



   1:28:00 CST,            



   Stop date:            



   16            



   1:28:00            



   CSTNotes: Same            



   as Keppra  Mix            



   with 100 mL            



   NS, LR or D5W            



    ***            



   MEDICATION            



   WASTE ***            



   Product Size:            



   500 mg Product            



   Wasted:  ___            



   mg            



               

 

 sodium chloride  1,000 mL,    No Longer        [facility]



 0.9% 1000 ml  Rate: 75    Active      [facility]



 INJ 1,000 mL  ml/hr, Infuse            Center



   over: 13.3 hr,            



   Route: IV,            



   Dosing Weight            



   85.455 kg,            



   Total Volume:            



   1,000, Start            



   date: 16            



   1:28:00 CST,            



   Duration: 30            



   day, Stop            



   date: 17            



   1:27:00 CST            



               



               

 

 Saline Flush  10 mL, Route:    No Longer        [facility]



 0.9%  IVP, Drug    Active      016  [facility]



   Form: INJ,            Center



   Dosing Weight            



   85.455, kg,            



   PRN, PRN Line            



   Flush, Start            



   date: 16            



   0:47:00 CST,            



   Duration: 30            



   day, Stop            



   date: 17            



   0:46:00            



   CSTNotes: Same            



   as: BD            



   Posiflush            



   Sterile            



               



               

 

 Lisinopril  5 mg, 1 tab,    No Longer        [facility]



   Route: PO,    Active      014  [facility]



   Drug form:            Center



   TAB, Daily,            



   Dosing Weight            



   90.909, kg,            



   Start date:            



   14            



   9:00:00,            



   Duration: 30            



   day, Stop            



   date: 14            



   9:00:00(Same            



   as: Prinivil,            



   Zestril)            



               



               

 

 Ascorbic Acid /  1 tab, Route:    No Longer        [facility]



 Biotin / Folic  PO, Drug Form:    Active      014  [facility]



 Acid / Niacin /  TAB, Dosing            Center



 pantothenate /  Weight 90.909,            



 pyridoxine /  kg, Daily,            



 Riboflavin /  Start date:            



 Thiamine 14            



 Vitamin B 12  9:00:00,            



   Duration: 30            



   day, Stop            



   date: 14            



   9:00:00(Same            



   as:Thera)            



   Take with            



   food.            



               

 

 Vitamin B12  1,000    No Longer        [facility]



   microgram, 1    Active      014  [facility]



   tab, Route:            Center



   PO, Drug form:            



   TAB, Daily,            



   Dosing Weight            



   90.909, kg,            



   Start date:            



   14            



   9:00:00,            



   Duration: 30            



   day, Stop            



   date: 14            



   9:00:00(Same            



   As: Vitamin            



   B-12)            



               

 

 lisinopril 5 mg  5 mg=1 tab,    Active        [facility]



 oral tablet  PO, Daily, #          014  [facility]



   30 tab, 0            Center



   Refill(s)            



               



               

 

 Zoloft  100 mg, 1 tab,    Inactive        [facility]



   Route: PO,          014  [facility]



   Drug form:            Center



   TAB, Daily,            



   Dosing Weight            



   90.909, kg,            



   Start date:            



   14            



   14:00:00,            



   Duration: 30            



   day, Stop            



   date: 14            



   9:00:00(Same            



   as: Zoloft)            



               



               

 

 lisinopril 20  40 mg=2 tab,    Inactive        [facility]



 mg oral tablet  PO, Daily, #          014  [facility]



   30 tab, 0            Center



   Refill(s)            



               



               

 

 atorvastatin 80  80 mg=1 tab,    Active        [facility]



 mg oral tablet  PO, Bedtime, #          014  [facility]



   30 tab, 0            Center



   Refill(s)            



               



               

 

 Aspirin 81 MG  81 mg=1 tab,    Active        [facility]



 Enteric Coated  PO, Daily, #          014  [facility]



 Tablet  30 tab, 0            Center



   Refill(s)            



               



               

 

 Aspirin /  81 mg, 1 tab,    Inactive        [facility]



 Calcium  Route: PO,          014  [facility]



 Carbonate  Drug form:            [facility]



   ECTAB, Daily,            



   Dosing Weight            



   90.909, kg,            



   Start date:            



   14            



   13:30:00,            



   Duration: 30            



   day, Stop            



   date: 14            



   9:00:00Do not            



   crush or chew.            



   (Same As:            



   Ecotrin)            



               



               

 

 Lisinopril  40 mg, 2 tab,    Inactive        [facility]



   Route: PO,          014  [facility]



   Drug form:            [facility]



   TAB, Daily,            



   Dosing Weight            



   90.909, kg,            



   Priority: NOW,            



   Start date:            



   14            



   12:07:00,            



   Duration: 30            



   day, Stop            



   date: 14            



   9:00:00(Same            



   as: Prinivil,            



   Zestril)            



               



               

 

 Ascorbic Acid  1 tab, PO,    Active        [facility]



 200 MG / Beta  Daily, # 30          014  [facility]



 Carotene 1000  tab, 0            Center



 UNT / cuprous  Refill(s)            



 oxide 2 MG /              



 dl-alpha              



 tocopheryl              



 acetate 60 UNT              



 / Lutein 2 MG /              



 sodium selenate              



 0.055 MG / Zinc              



 Oxide 40 MG              



 Oral Tablet              



 [Ocuvite]              



               



               

 

 Vitamin B12  1 tablet, PO,    Active        [facility]



   Daily, 0          014  [facility]



   Refill(s)            [facility]



               



               

 

 Ascorbic Acid /  1 tablet, PO,    Active        [facility]



 Biotin / Folic  Daily, 0          014  [facility]



 Acid / Niacin /  Refill(s)            [facility]



 pantothenate /              



 pyridoxine /              



 Riboflavin /              



 Thiamine /              



 Vitamin B 12              



               



               

 

 Aspirin 81 MG  81 mg=1 tab,    Inactive        [facility]



 Enteric Coated  PO, Daily          014  [facility]



 Tablet              [facility]



               



               

 

 Sertraline 100  100 mg=1 tab,    Active        [facility]



 MG Oral Tablet  PO, Daily          014  [facility]



 [Zoloft]              [facility]



               



               

 

 Iohexol  85 mL, Route:    Inactive        [facility]



   IVP, Drug          014  [facility]



   Form: [facility]



   Dosing Weight            



   90.909, kg,            



   ONCALL, STAT,            



   Start date:            



   14            



   6:27:00,            



   Duration: 1            



   doses or            



   times,            



   Dose=2.2ml/kg,            



    Max            



   dose=100ml --            



   "To be infused            



   by Radiology            



   Staff            



   ONLY"Dose=2.2m            



   l/kg,  Max            



   dose=100ml --            



   "To be infused            



   by Radiology            



   Staff            



   ONLY"(Same            



   as:Omnipaque            



   350).            



               

 

 Enoxaparin  40 mg, 0.4 mL,    Inactive        [facility]



   Route: SUB-Q,          014  [facility]



   Drug form:            [facility]



   INJ, [id number],            



   Dosing Weight            



   90.909, kg,            



   Start date:            



   14            



   6:00:00,            



   Duration: 30            



   day, Stop            



   date: 14            



   6:00:00(Same            



   as: Lovenox)            



               



               

 

 Acetaminophen  650 mg, 2 tab,    Inactive        [facility]



   Route: PO,          014  [facility]



   Drug form:            [facility]



   TAB, Q4H,            



   Dosing Weight            



   90.909, kg,            



   PRN Pain            



   1-3/Temp >            



   99.5 F, Start            



   date: 14            



   5:46:00,            



   Duration: 30            



   day, Stop            



   date: 14            



   5:45:00Do not            



   exceed 4            



   gm/day.  (Same            



   as: Tylenol)            



               



               

 

 atorvastatin  80 mg, 1 tab,    Inactive        [facility]



   Route: PO,          014  [facility]



   Drug form:            [facility] [place]



   TAB, Bedtime,            



   Dosing Weight            



   90.909, kg,            



   Priority: NOW,            



   Start date:            



   14            



   1:20:00,            



   Duration: 30            



   day, Stop            



   date: 14            



   21:00:00Same            



   as Lipitor            



               



               

 

 Aspirin /  325 mg, 1 tab,    Inactive        [facility]



 Calcium  Route: PO,          014  [facility]



 Carbonate  Drug form:            [facility]



   TAB, Daily,            



   Dosing Weight            



   90.909, kg,            



   Priority: NOW,            



   Start date:            



   14            



   1:20:00,            



   Duration: 30            



   day, Stop            



   date: 14            



   9:00:00Take            



   with food.            



               



               

 

 docusate sodium  100 mg, 1 cap,  PO  Active    [facility]     [place]



 100 mg oral  PO, Q12H, 20          012  [facility]



 capsule  cap,            Center



   Substitution            



   Allowed, CAP            



               



               

 

 Norco 5/325  1-2 tab, PO,  PO  Active    [facility]    [facility]



 oral tablet  Q4-6H, PRN, 30          012  [facility]



   tab, Pain,            Center



   Substitution            



   Allowed,            



   Maintenance            



               



               

 

 phenytoin 300  300 mg, 1 cap,  PO  Active    [facility]     [place]



 mg oral  PO, Bedtime, 5          012  [facility]



 capsule,  cap,            Center



 extended  Substitution            



 release  Allowed            



               



               

 

 Keflex 500 mg  500 mg, 1 cap,  PO  Active    [facility]    [facility]



 oral capsule  PO, Q8H, 15          012  [facility]



   cap,            Center



   Substitution            



   Allowed            



               



               

 

 Lovenox  30 mg, 0.3 mL,  SUB-Q  No Longer    [facility]    [facility]



   Route: SUB-Q,    Active      012  [facility]



   Drug form:            Center



   INJ, Q12H,            



   Start date:            



   12            



   9:00:00,            



   Duration: 30            



   day, Stop            



   date: 12            



   21:00:00            



               



               

 

 docusate sodium  100 mg, 1 cap,  PO  No Longer    [facility]     [place]



 100 mg oral  Route: PO,    Active      012  [facility]



 capsule  Drug form:            Center



   CAP, Q12H,            



   Start date:            



   12            



   21:00:00,            



   Duration: 30            



   day, Stop            



   date: 12            



   9:00:00            



               



               

 

 cefazolin  1 gm, Route:  IVPB  No Longer    [facility]    [facility]



   IVPB, Drug    Active      012  [facility]



   form: PDR/INJ,            Center



   ABXQ8H, Start            



   date: 12            



   15:00:00,            



   Duration: 24            



   hr, Stop date:            



   12            



   7:00:00            



               



               

 

 cefazolin  1 gm, Route:  IVPB  No Longer    [facility]    [facility]



 (SCIP)  IVPB, Drug    Active      012  [facility]



   form: PDR/INJ,            Center



   Q8H, Start            



   date: 12            



   10:30:00,            



   Duration: 3            



   doses or            



   times, Stop            



   date: 12            



   2:30:00, (for            



   patients            



   weighing less            



   than 70            



   kg)(for            



   patients            



   weighing less            



   than 70 kg)            



               



               

 

 Zofran  4 mg, 2 mL,  IVP  No Longer    [facility]    [facility]



   Route: IVP,    Active      012  [facility]



   Drug form:            [facility]



   INJ, Q8H, PRN            



   Nausea &            



   Vomiting,            



   Start date:            



   12            



   9:06:00,            



   Duration: 30            



   day, Stop            



   date: 12            



   9:05:00            



               



               

 

 morphine  2 mg, 1 mL,  IV  No Longer    [facility]    [facility]



 Sulfate  Route: IV,    Active      012  [facility]



   Drug form:            [facility]



   INJ, Q4H, PRN            



   Pain, Start            



   date: 12            



   9:01:00,            



   Duration: 30            



   day, Stop            



   date: 12            



   9:00:00            



               



               

 

 acetaminophen-h  1 tab, Route:  PO  No Longer    [facility]    [facility]



 ydrocodone 325  PO, Drug Form:    Active      012  [facility]



 mg-10 mg oral  TAB, Q4H,            Center



 tablet  Start date:            



   12            



   9:00:00,            



   Duration: 30            



   day, Stop            



   date: 12            



   8:00:00            



               



               

 

 Dilantin 100 mg  100 mg, 1 cap,  PO  No Longer    [facility]    [facility]



 oral capsule,  Route: PO,    Active      012  [facility]



 extended  Drug form:            [facility]



 release  ERCAP, TID,            



   Start date:            



   12            



   9:00:00,            



   Duration: 7            



   day, Stop            



   date: 12            



   17:00:00            



               



               

 

 phenytoin  100 mg, 1 cap,  PO  No Longer    [facility]    [facility]



   Route: PO,    Active      012  [facility]



   Drug form:            [place]



   ERCAP, Q8H,            



   Start date:            



   12            



   8:00:00,            



   Duration: 7            



   day, Stop            



   date: 12            



   0:00:00            



               



               

 

 Insulin regular  1 unit, 0.01  SUB-Q  No Longer    [facility]    [facility]



   mL, Route:    Active      012  [facility]



   SUB-Q, Drug            Center



   form: SOLN,            



   TID-Before            



   Meals, PRN            



   Blood Glucose            



   Results, Start            



   date: 12            



   6:05:00,            



   Duration: 30            



   day, Stop            



   date: 12            



   6:04:00            



               



               

 

 bisacodyl  10 mg, 2 tab,  PO  No Longer    [facility]    [facility]



   Route: PO,    Active      012  [facility]



   Drug form:            [facility]



   ECTAB, Q24H,            



   PRN            



   Constipation,            



   Start date:            



   12            



   6:05:00,            



   Duration: 30            



   day, Stop            



   date: 12            



   6:04:00            



               



               

 

 docusate  100 mg, 1 cap,  PO  No Longer    -[facility]    [facility]



   Route: PO,    Active      012  [facility]



   Drug form:            [facility]



   CAP, BID, PRN            



   Constipation,            



   Start date:            



   12            



   6:05:00,            



   Duration: 30            



   day, Stop            



   date: 12            



   6:04:00            



               



               

 

 acetaminophen-h  1 tab, Route:  PO  No Longer    [facility]    [facility]



 ydrocodone 325  PO, Drug Form:    Active      012  [facility]



 mg-10 mg oral  TAB, Q4H, PRN            [facility]



 tablet  Pain Score            



   1-3, Start            



   date: 12            



   6:05:00,            



   Duration: 30            



   day, Stop            



   date: 12            



   6:04:00            



               



               

 

 morphine  4 mg, 1 mL,  IVP  No Longer    [facility]    [facility]



 Sulfate  Route: IVP,    Active      012  [facility]



   Drug form:            [facility] [place]



   INJ, Q4H, PRN            



   Pain Score            



   7-10, Start            



   date: 12            



   6:05:00,            



   Duration: 30            



   day, Stop            



   date: 12            



   6:04:00            



               



               

 

 Dilaudid  1 mg, Route:  IV  No Longer    [facility]    [facility]



   IV, ONCE,    Active      012  [facility]



   Priority:            Center



   STAT, Start            



   date: 12            



   2:23:00, Stop            



   date: 12            



   2:23:00            



               



               

 

 D5W 1/2NS 1,000  1,000 mL,  IV  No Longer    [facility]    [facility]



 mL  Rate: 100    Active      012  [facility]



   ml/hr, Infuse            Center



   over: 10 hr,            



   Route: IV,            



   Dosing Weight            



   91 kg, Total            



   Volume: 1,000,            



   Priority:            



   STAT, Start            



   date: 12            



   1:48:00,            



   Duration: 30            



   day, Stop            



   date: 12            



   1:47:00            



               



               

 

 Sodium Chloride  1,000 mL,  IV  No Longer    [facility]    [facility]



 0.9% (Bolus) IV  Rate: 100    Active      012  [facility]



 1,000 mL  ml/hr, Infuse            Center



   over: 10 hr,            



   Route: IV,            



   Dosing Weight            



   90.909 kg,            



   Total Volume:            



   1,000, Bolus            



   Dose,            



   Priority:            



   STAT, Start            



   date: 12            



   1:24:00,            



   Duration: 1            



   doses or            



   times, Stop            



   date: 12            



   11:23:00            



               



               

 

 cefazolin  1 gm, Route:  IVPB  No Longer    [facility]    [facility]



   IVPB, Drug    Active      012  [facility]



   form: PDR/INJ,            Center



   ONCE,            



   Priority:            



   STAT, Start            



   date: 12            



   1:23:00, Stop            



   date: 12            



   1:23:00            



               



               

 

 fosphenytoin  1,000 mg, 20  IVPB  No Longer    [facility]    [facility]



   mL, Route:    Active      012  [facility]



   IVPB, ONCE,            Center



   Priority:            



   STAT, Start            



   date: 12            



   22:12:00, Stop            



   date: 12            



   22:12:00            



               



               

 

 aspirin 81 mg  Substitution    No Longer        [facility]



 tablet,  Allowed    Active      012  [facility]



 chewable              [facility]



               



               

 

 citalopram  Substitution    Active        [facility]



   Allowed          [address] [place] [address]



               Center



               



               







Allergies, Adverse Reactions, Alerts







 Substance  Category  Reaction  Severity  Reaction  Status  Date  Comments  
Source



         type    Reported    



                 



                 



                 

 

 NKDA  Assertion      Drug  Active      [facility] [place]



         allergy        [facility]



                 



                 







Immunizations







 Immunization  Date Given  Site  Status  Last  Comments  Source



         Updated    



             



             

 

 pneumococcal  2016  Right  completed  [facility]    [facility]



 13-valent vaccine    Deltoid        [facility]



             



             







Results







 Order Name  Results  Value  Reference  Date  Interpretation  Comments  Source



       Range        



               



               



               

 

 CHEM PANEL  Bili Total  1.2 mg/dL  0.2 - 1.3        [address]



               



               



               

 

 CHEM PANEL  Total Protein  6.8 g/dL  6.4 - 8.4        [address]



               



               



               

 

 CHEM PANEL  ALT  18 unit/L  0 - 65        [address]



               



               



               

 

 CHEM PANEL  Alk Phos  56 unit/L  39 - 136        [address]



               



               



               

 

 CHEM PANEL  Albumin Lvl  3.2 g/dL  3.5 - 5.0        [address]



               



               



               

 

 CHEM PANEL  AST  23 unit/L  0 - 37        [address]



               



               



               

 

 CHEM PANEL  eGFR  82        Result Comment: The eGFR is calculated using 
the CKD-EPI formula. In most young, healthy individuals the eGFR will be >90 mL/
min/1.73m2. The eGFR declines with age. An eGFR of 60-89 may be normal in  [facility] 
[place]



     mL/min/1.7    /    some populations, particularly the elderly, for 
whom the CKD-EPI formula has not been extensively validated. Use of the eGFR is 
not recommended in the following populations:  [address]



             Individuals with unstable creatinine concentrations, including 
pregnant patients and those with serious co-morbid conditions.  



               



             Patients with extremes in muscle mass or diet.  



               



             The data above are obtained from the National Kidney Disease 
Education Program (NKDEP) which additionally recommends that when the eGFR is 
used in patients with extremes of body mass index for purposes  



             of drug dosing, the eGFR should be multiplied by the estimated 
BMI.  

 

 CHEM PANEL  Calcium Lvl  7.9 mg/dL  8.5 - 10.5        [address]



               



               



               

 

 CHEM PANEL  CO2  27 meq/L  24 - 32        [address]



               



               



               

 

 CHEM PANEL  Chloride Lvl  108 meq/L  95 - 109        [address]



               



               



               

 

 CHEM PANEL  Potassium Lvl  3.6 meq/L  3.5 - 5.1        [address]



               



               



               

 

 CHEM PANEL  Sodium Lvl  141 meq/L  135 - 145        [address]



               



               



               

 

 CHEM PANEL  BUN  16 mg/dL  7 - 22        [address]



               



               



               

 

 CHEM PANEL  Creatinine  0.86 mg/dL  0.50 -        [facility]



   Lvl    1.40        [facility]



               



               



               

 

 CHEM PANEL  Glucose Lvl  107 mg/dL  70 - 99        [address]



               



               



               

 

 CHEM PANEL  Globulin  3.6 g/dL  2.7 - 4.2        [address]



               



               



               

 

 CHEM PANEL  A/G Ratio  0.9  0.7 - 1.6        [address]



               



               



               

 

 CHEM PANEL  B/C Ratio  19  6 - 25        [address]



               



               



               

 

 CHEM PANEL  AGAP  9.6 meq/L  10.0 -        [facility]



       20.0        [facility]



               



               



               

 

 HEMATOLOGY  Basophils  1.3 %  0.0 - 1.0        [address]



               



               



               

 

 HEMATOLOGY  Eosinophils  8.7 %  0.0 - 4.0        [address]



               



               



               

 

 HEMATOLOGY  Monocytes  8.6 %  2.0 - 12.0        [address]



               



               



               

 

 HEMATOLOGY  Basophils #  0.1 K/CMM  0.0 - 0.2        [address]



               



               



               

 

 HEMATOLOGY  Lymphocytes #  2.2 K/CMM  1.0 - 5.5        [address]



               



               



               

 

 HEMATOLOGY  Segs-Bands #  3.3 K/CMM  1.5 - 8.1        [address]



               



               



               

 

 HEMATOLOGY  Monocytes #  0.6 K/CMM  0.0 - 0.8  12       [place]



         /2017      [facility]



               



               



               

 

 HEMATOLOGY  Eosinophils #  0.6 K/CMM  0.0 - 0.5         [place]



         /2017      [facility]



               



               



               

 

 HEMATOLOGY  Lymphocytes  32.7 %  20.0 -         [place]



       40.0  /      [facility]



               



               



               

 

 HEMATOLOGY  Segs  48.7 %  45.0 -         [place]



       75.0  /      [facility]



               



               



               

 

 HEMATOLOGY  PT  13.4 s  12.0 -         [place]



       14.7        [facility]



               



               



               

 

 HEMATOLOGY  INR  1.02  0.85 -        [facility]



       1.17        [facility]



               



               



               

 

 HEMATOLOGY  PTT  27.9 s  22.9 -        [facility]



       35.8        [facility]



               



               



               

 

 HEMATOLOGY  Hct  42.1 %  42.0 -        [facility]



       54.0        [facility]



               



               



               

 

 HEMATOLOGY  MCV  91.8 fL  80.0 -         [place]



       94.0        [facility]



               



               



               

 

 HEMATOLOGY  MCHC  34.5 g/dL  32.0 -         [place]



       36.0        [facility]



               



               



               

 

 HEMATOLOGY  MCH  31.6 pg  27.0 -         [place]



       31.0        [facility]



               



               



               

 

 HEMATOLOGY  Platelet  152 K/CMM  133 - 450         [place]



         /2017      [facility]



               



               



               

 

 HEMATOLOGY  RDW  13.6 %  11.5 -         [place]



       14.5        [facility]



               



               



               

 

 HEMATOLOGY  MPV  8.8 fL  7.4 - 10.4         [place]



         /2017      [facility]



               



               



               

 

 HEMATOLOGY  Hgb  14.5 g/dL  14.0 -         [place]



       18.0        [facility]



               



               



               

 

 HEMATOLOGY  RBC  4.59 M/CMM  4.70 -         [place]



       6.10        [facility]



               



               



               

 

 HEMATOLOGY  WBC  6.7 K/CMM  3.7 - 10.4         [place]



         /2017      [facility]



               



               



               

 

 Spine  Spine  EXAM: CT CERVICAL SPINE WITHOUT CONTRAST        -  [facility]



 cervical wo  cervical     -  Medical



 contrast CT  contrast CT            Center



               



     DATE: 2017 1:09 AM CST        Read by:  Morgan Diaz MD  



             Dictated Date/time:  17 10:19  



             Electronically Signed by:  Morgan Diaz MD                 
   17 10:21  



             FINAL REPORT  



     INDICATION:  - s/p fall          



               



               



               



     COMPARISON: None available          



               



               



               



     TECHNIQUE:  Volumetric CT acquisition of the cervical spine without 
contrast. Axial, sagittal and coronal reconstructions.          



               



     IV contrast: None.          



               



     DLP: 540.44 mGy-cm          



               



               



               



     DISCUSSION: The spine is imaged from the skull base to the level of T3.   
       



               



               



               



     No acute fracture or malalignment is identified. The level diffuse 
degenerative changes are present. Facet hypertrophic changes and uncovertebral 
hypertrophic changes are also present. No significant li          



     sthesis.  No soft tissue abnormality is identified.          



               



               



               



               



               



     IMPRESSION: No acute abnormality of the cervical spine. Multilevel diffuse 
degenerative changes.          



               



               



               



               

 

 Brain wo  Brain wo  EXAM: CT BRAIN        [facility]



 contrast CT  contrast CT      /2017    -  Medical



             This report was dictated by a Radiology Resident/Fellow.  I have 
personally reviewed the images as  Center



             well as the Resident's interpretation and agree with the findings.
  



     DATE: 2017 at 22:02        Read by:  Lius Alfredo Quinones MD        
     Resident:  Luis Alfredo Quinones MD  



             Dictated Date/time:  17 22:08  



             Electronically Signed by:  Ruth Bangura MD                  
   17 23:24  



             FINAL REPORT  



     CLINICAL INFORMATION:  - s/p fall          



               



               



               



     COMPARISON: CT brain 2017 at 15:18 from outside hospital          



               



               



               



     TECHNIQUE: Axial images of the brain were obtained from the skull base 
through the vertex without contrast material administration.          



               



               



               



     DLP: 865 mGycm          



               



               



               



     DISCUSSION:          



               



               



               



     Stable small hyperdense lesion in the right occipitoparietal region (image 
31 series 5). Areas of hypodensity in the left MCA /PCA watershed territories 
secondary to remote ischemia. Confluent hypodensi          



     ty in the supratentorial white matter compatible with leukoaraiosis. 
Encephalomalacia in the right frontal lobe. Compensatory dilatation of the 
lateral ventricles, left greater than right. No midline shift or brain 
herniation.          



               



               



               



     IMPRESSION:          



               



               



               



     Stable small hyperdense lesion in the right occipitoparietal region 
potentially a small focus of hemorrhage in the setting of recent trauma versus 
a cavernous angioma.          



               



               



               



               

 

 Brain-Outsi  Brain-Outside  EXAM: CT BRAIN WITHOUT CONTRAST      [facility]



 de Consult  Consult CT      /    -  Medical



 CT              Center



               



     INDICATION:  - outside study, pain post trauma        Read by:  Juana Barboza MD  



             Dictated Date/time:  17 12:02  



             Electronically Signed by:  Juana Barboza MD                      
   17 12:06  



             FINAL REPORT  



     COMPARISON: 2016          



               



               



               



     TECHNIQUE: Routine axial CT images of the brain were obtained.          



               



               



               



     DISCUSSION:          



               



               



               



     Hyperdense focus at the right temporal occipital junction without 
surrounding edema or mass effect. Otherwise no additional hemorrhages 
identified. Encephalomalacia throughout the left hemisphere and ri          



     ght parietal-occipital lobes. Microangiopathic changes and generalized 
volume loss are present.          



               



               



               



     IMPRESSION:          



               



               



               



     Small hyperdense focus at the left temporal occipital junction may 
represent a small focus of contusion. No mass effect. Agree with the outside 
report.          



               



               



               



               

 

 Brain-Outsi  Brain-Outside  EXAM: CT FACIAL BONES OUTSIDE CONSULTATION        -  [facility] Consult  Consult CT      /    -  Medical



 CT              Center



               



     DATE: 2017 8:53 PM CST        Read by:  Rico Guevara MD  



             Dictated Date/time:  17 12:44  



             Electronically Signed by:  Rico Guevara MD                
   17 12:45  



             FINAL REPORT  



     INDICATION: Second interpretation of outside CT performed on trauma 
transfer patient.          



               



               



               



     COMPARISON: None.          



               



               



               



     TECHNIQUE:  Multiplanar images of the facial bones without contrast. Axial
, sagittal and coronal images are provided.          



               



     IV contrast: None.          



               



               



               



     OUTSIDE REPORT: From [facility]. No acute facial 
bone fracture.          



               



               



               



     DISCUSSION:          



               



     No acute fracture or malalignment is identified. No acute soft tissue 
abnormality is identified.          



               



               



               



               



               



     IMPRESSION:          



               



     No acute abnormality.          



               



               



               



     This report is in agreement with the initial interpretation obtained from 
the outside facility.          



               



               



               



               

 

 CARDIAC  Total CK  113 unit/L  12 - 191        [facility]



 ENZYMES        /2016      [facility]



               



               



               

 

 CARDIAC  Troponin-I  null  0.00 -        [facility]



 ENZYMES      0.40  /      [facility]



               



               



               

 

 CARDIAC  CK MB Index  1.6  0.0 - 2.5        [facility]



 ENZYMES        /2016      [facility]



               



               



               

 

 CARDIAC  CK MB  1.8 ng/mL  0.5 - 3.6        [facility]



 ENZYMES        /2016      [facility]



               



               



               

 

 Carotid  Carotid  EXAM: US EXTRACRANIAL ARTERIAL DOPPLER        -  [facility]



 artery  artery      /    -  Medical



 Doppler  Doppler bilat            Center



 bilat US  US            



     DATE: 2016 1:37 AM CST        Read by:  Galileo Gomez MD
  



             Dictated Date/time:  16 09:09  



             Electronically Signed by:  Galileo Gomez MD           
   16 09:11  



             FINAL REPORT  



     INDICATION: Syncope and collapse          



               



               



               



     COMPARISON: None.          



               



               



               



     TECHNIQUE: Multiplanar grayscale, color Doppler and spectral Doppler 
ultrasound images of the carotid and vertebral arteries.          



               



               



               



     DISCUSSION:          



               



               



               



     Right Carotid System:          



               



     Right Common Carotid Artery (RCCA): 76.3 cm/s          



               



     Right Internal Carotid Artery (DEVAUGHN): 75.7 cm/s          



               



     The ICA/CCA ratio on the right is 0.99.          



               



               



               



     Right Vertebral Artery (RVA):  Antegrade flow with normal waveform.       
   



               



               



               



     Left Carotid System:          



               



     Left Common Carotid Artery (LCCA): 110 cm/s          



               



     Left Internal Carotid Artery (LICA): 77.8 cm/s          



               



     The ICA/CCA ratio on the left is 0.71          



               



               



               



     Left Vertebral Artery (LVA):  Antegrade flow with normal waveform.        
  



               



               



               



     IMPRESSION:          



               



     Unremarkable carotid Doppler without hemodynamically significant stenosis.
          



               



               



               



     According to the 2003 Consensus criteria:          



               



     <50% stenosis: PSV <125 cm/sec, EDV <40cm/sec, ICA:CCA ratio <2          



               



     50-69% stenosis: -230cm/sec, EDV 40-100cm/sec, ICA:CCA ratio 2-4   
       



               



     >70% stenosis: PSV >230cm/sec, EDV >100cm/sec, ICA:CCA ratio >4          



               



               



               



     Reference:          



               



     Radiology. 2003 [id number]. Carotid Artery Stenosis: Gray-scale and 
Doppler US diagnosis- Society of Radiologists in Ultrasound Consensus 
Conference. Onofre EG, Andrés CB, Janice GL, et. al.          



               



               



               



               

 

 CARDIAC  Troponin-T  null  0.000 -         [place]



 ENZYMES      0.      [facility]



               



               



               

 

 CARDIAC  Troponin-I  null  0.00 -         [place]



 ENZYMES      0.      [facility]



               



               



               

 

 CARDIAC  Total CK  102 unit/L         [place]



 ENZYMES              [facility]



               



               



               

 

 CARDIAC  CK MB  2.1 ng/mL  0.5 - 3.6         [place]



 ENZYMES        /2016      [facility]



               



               



               

 

 CARDIAC  CK MB Index  2.1  0.0 - 2.5         [place]



 ENZYMES        /2016      [facility]



               



               



               

 

 CARDIAC  CK MB Index  1.8  0.0 - 2.5         [place]



 ENZYMES        /2016      [facility]



               



               



               

 

 CARDIAC  CK MB  1.7 ng/mL  0.5 - 3.6         [place]



 ENZYMES        /2016      [facility]



               



               



               

 

 CARDIAC  Troponin-T  null  0.000 -         [place]



 ENZYMES      0.100  /2016      [facility]



               



               



               

 

 CARDIAC  Troponin-I  null  0.00 -         [place]



 ENZYMES      0.40  /      [facility]



               



               



               

 

 CARDIAC  Total CK  92 unit/L         [place]



 ENZYMES        /2016      [facility]



               



               



               

 

 DRUG SCREEN  U Phencyc Scr  Negative  Negative         [place]



         /2016      Medical



     *NA*          [facility]



               



     (16 5:32 AM)          



               

 

 DRUG SCREEN  U Angela Scr  Negative  Negative         [place]



         /2016      Medical



     *NA*          [place]



               



     (16 5:32 AM)          



               

 

 DRUG SCREEN  U Amph Scr  Negative  Negative         [place]



         /2016      [facility]*          [place]



               



     (16 5:32 AM)          



               

 

 DRUG SCREEN  U Opiate Scr  Negative  Negative         [place]



         /2016      Medical



     *NA*          [place]



               



     (16 5:32 AM)          



               

 

 DRUG SCREEN  U Cocaine Scr  Negative  Negative         [place]



         /2016      [facility]*          [place]



               



     (16 5:32 AM)          



               

 

 DRUG SCREEN  U Cannab Scr  Negative  Negative         [place]



         /2016      [facility]*          [place]



               



     (16 5:32 AM)          



               

 

 DRUG SCREEN  UDS Note  See Note           [place]



         /2016      [facility]



               



     (16 5:32 AM)          



               

 

 DRUG SCREEN  U Benzodia  Negative  Negative        [facility]



   Scr            Medical



     *NA*          [place]



               



     (16 5:32 AM)          



               

 

 URINE AND  UA Mucus  Few /LPF  None Seen        [facility]



 STOOL      /LPF  /      [facility]



               



               



               

 

 URINE AND  UA WBC  3 /HPF  0 - 5        [facility]



 STOOL              [facility]



               



               



               

 

 URINE AND  UA Sq Epi  Occasional  Few /LPF        [facility]



 STOOL    /LPF    /      [facility]



               



               



               

 

 URINE AND  UA Nitrite  Negative  Negative        [facility]



 STOOL              [facility]



     (16 5:32 AM)          [place]



               



               



               

 

 URINE AND  UA Leuk Est  Negative  Negative        [facility]



 STOOL              [facility]



     (16 5:32 AM)          [place]



               



               



               

 

 URINE AND  UA  0.2 mg/dL  0.1 - 1.0        [facility]  Urobilinogen      /      [facility]



               



               



               

 

 URINE AND  UA Glucose  Negative  Negative        [facility]



 STOOL              [facility]



     (16 5:32 AM)          [place]



               



               



               

 

 URINE AND  UA Ketones  Negative  Negative        [facility]



 STOOL              [facility]



     (16 5:32 AM)          [place]



               



               



               

 

 URINE AND  UA Color  Light Yellow  Yellow        [facility]



 STOOL        /      [facility]



     (16 5:32 AM)          [place]



               



               



               

 

 URINE AND  UA Turbidity  Clear  Clear        [facility]



 STOOL              [facility]



     (16 5:32 AM)          [place]



               



               



               

 

 URINE AND  UA Spec Grav  1.007  <=1.030        [facility]        /      [facility]



               



               



               

 

 URINE AND  UA Blood  Negative  Negative        [facility]        /      [facility]



     (16 5:32 AM)          [place]



               



               



               

 

 URINE AND  UA pH  7.5  5.0 - 8.0        [facility]        /      [facility]



               



               



               

 

 URINE AND  UA Bili  Negative  Negative        [facility]        /      [facility]



     (16 5:32 AM)          [place]



               



               



               

 

 URINE AND  UA Protein  Negative  Negative        [facility]        /      [facility]



     (16 5:32 AM)          [place]



               



               



               

 

 BLOOD BANK  ABO/Rh  A POS          [facility]        /      [facility]



               



               



               

 

 BLOOD BANK  Antibody Scrn  Negative          [facility]        /      [facility]



     (16 3:12 AM)          [place]



               



               



               

 

 Brain wo  Brain wo  EXAM: CT BRAIN WITHOUT CONTRAST        -  [facility]



 contrast CT  contrast CT      /    -  [facility]



               



     DATE: 2016 8:12 AM        Read by:  Mehul Neal MD  



             Dictated Date/time:  16 08:50  



             Electronically Signed by:  Mehul Neal MD            
   16 08:58  



             FINAL REPORT  



     INDICATION: Headache, status post fall, transfer for intracranial 
hemorrhage          



               



               



               



     COMPARISON: None          



               



               



               



     TECHNIQUE: Noncontrast axial imaging of the brain was acquired from the 
vertex to the skull base.          



               



     DLP: 1077mGy-cm          



               



               



               



     FINDINGS:          



               



     Small amount subarachnoid hemorrhage is present left frontoparietal 
vertex. A small focus of left frontal region hemorrhage (images 25-26) is 
favored to be subarachnoid. No definitive acute parenchymal abnormality.       
   



               



               



               



     Diffuse microangiopathic changes. Left parietal-occipital encephalomalacia 
and small focus of encephalomalacia at the posterior left frontal vertex. 
Generalized volume loss with passive ventriculomegaly          



     . No midline shift or herniation. The imaged paranasal sinuses and mastoid 
air cells are clear. Calvarium and skull base are intact.          



               



               



               



     IMPRESSION:  Small volume of subarachnoid hemorrhage. No definitive acute 
parenchymal abnormality.          



               



               



               



               

 

 CHEM PANEL  Lactic Acid  1.2 mmol/L  0.5 - 2.2        [facility]            [facility]



               



               



               

 

 CHEM PANEL  Calcium Lvl  8.9 mg/dL  8.5 - 10.5        [facility]      [facility]



               



               



               

 

 CHEM PANEL  CO2  28 meq/L  24 - 32        [facility]      [facility]



               



               



               

 

 CHEM PANEL  eGFR  67        Result Comment: The eGFR is calculated using 
the CKD-EPI formula. In most young, healthy individuals the eGFR will be >90 mL/
min/1.73m2. The eGFR declines with age. An eGFR of 60-89 may be normal in  [facility] 
[place]



     mL/min/1.    some populations, particularly the elderly, for 
whom the CKD-EPI formula has not been extensively validated. Use of the eGFR is 
not recommended in the following populations:  [address]



             Individuals with unstable creatinine concentrations, including 
pregnant patients and those with serious co-morbid conditions.  



               



             Patients with extremes in muscle mass or diet.  



               



             The data above are obtained from the National Kidney Disease 
Education Program (NKDEP) which additionally recommends that when the eGFR is 
used in patients with extremes of body mass index for purposes  



             of drug dosing, the eGFR should be multiplied by the estimated 
BMI.  

 

 CHEM PANEL  Chloride Lvl  105 meq/L  95 - 109         [place]



         /2016      [facility]



               



               



               

 

 CHEM PANEL  Potassium Lvl  4.4 meq/L  3.5 - 5.1        [facility]      [facility]



               



               



               

 

 CHEM PANEL  Creatinine  1.05 mg/dL  0.50 -        [facility]



   Lvl    1.40        [facility]



               



               



               

 

 CHEM PANEL  Sodium Lvl  141 meq/L  135 - 145        [facility]      [facility]



               



               



               

 

 CHEM PANEL  Glucose Lvl  106 mg/dL  70 - 99        [facility]      [facility]



               



               



               

 

 CHEM PANEL  BUN  19 mg/dL  7 - 22        [facility]      [facility]



               



               



               

 

 CHEM PANEL  AGAP  12.4 meq/L  10.0 -        [facility]



       20.0        [facility]



               



               



               

 

 CHEM PANEL  Lactic Acid  0.9 mMol/L  0.5 - 2.2        [facility]            [facility]



               



               



               

 

 HEMATOLOGY  Estimated %  1.2 %  0.0 - 7.5        [facility]



   Lysis Rapid      /2016      [facility]



               



               



               

 

 HEMATOLOGY  [place]  0.6 min          [facility]      [facility]



               



               



               

 

 HEMATOLOGY  K-time Rapid  1.7 min  0.6 - 2.3        [facility]      [facility]



               



               



               

 

 HEMATOLOGY  R-time Rapid  0.7 min  0.4 - 0.7        [facility]      [facility]



               



               



               

 

 HEMATOLOGY  Angle Rapid  73 degrees  64 - 80        [facility]      [facility]



               



               



               

 

 HEMATOLOGY  ACT (TEG)  113 s  86 - 118        [facility]      [facility]



               



               



               

 

 HEMATOLOGY  G-value Rapid  7.0 K d/sc  5.0 - 11.6         [place]



         /2016      [facility]



               



               



               

 

 HEMATOLOGY  Max Amplitude  58 mm  52 - 71         [place]



   Rapid      /2016      [facility]



               



               



               

 

 HEMATOLOGY  Platelet  160 K/CMM  133 - 450         [place]



         /2016      [facility]



               



               



               

 

 HEMATOLOGY  MPV  8.9 fL  7.4 - 10.4         [place]



         /2016      [facility]



               



               



               

 

 HEMATOLOGY  RDW  13.6 %  11.5 -         [place]



       14.5        [facility]



               



               



               

 

 HEMATOLOGY  WBC  8.9 K/CMM  3.7 - 10.4         [place]



         /2016      [facility]



               



               



               

 

 HEMATOLOGY  Hgb  15.5 g/dL  14.0 -         [place]



       18.0        [facility]



               



               



               

 

 HEMATOLOGY  RBC  4.97 M/CMM  4.70 -         [place]



       6.10        [facility]



               



               



               

 

 HEMATOLOGY  MCH  31.2 pg  27.0 -         [place]



       31.0        [facility]



               



               



               

 

 HEMATOLOGY  Hct  45.5 %  42.0 -         [place]



       54.0        [facility]



               



               



               

 

 HEMATOLOGY  MCHC  34.1 g/dL  32.0 -         [place]



       36.0        [facility]



               



               



               

 

 HEMATOLOGY  MCV  91.5 fL  80.0 -         [place]



       94.0        [facility]



               



               



               

 

 HEMATOLOGY  Monocytes  10.7 %  2.0 - 12.0         [place]



         /2016      [facility]



               



               



               

 

 HEMATOLOGY  Eosinophils  8.3 %  0.0 - 4.0         [place]



         /2016      [facility]



               



               



               

 

 HEMATOLOGY  Basophils  0.9 %  0.0 - 1.0         [place]



         /2016      [facility]



               



               



               

 

 HEMATOLOGY  Segs-Bands #  4.6 K/CMM  1.5 - 8.1         [place]



         /2016      [facility]



               



               



               

 

 HEMATOLOGY  Segs  51.1 %  45.0 -         [place]



       75.0        [facility]



               



               



               

 

 HEMATOLOGY  Lymphocytes  29.0 %  20.0 -         [place]



       40.0        [facility]



               



               



               

 

 HEMATOLOGY  Monocytes #  0.9 K/CMM  0.0 - 0.8         [place]



         /2016      [facility]



               



               



               

 

 HEMATOLOGY  Eosinophils #  0.7 K/CMM  0.0 - 0.5         [place]



         /2016      [facility]



               



               



               

 

 HEMATOLOGY  Basophils #  0.1 K/CMM  0.0 - 0.2         [place]



         /2016      [facility]



               



               



               

 

 HEMATOLOGY  Lymphocytes #  2.6 K/CMM  1.0 - 5.5         [place]



         /      [facility]



               



               



               

 

 Spine-Outsi  Spine-Outside  EXAM: CT CERVICAL SPINE WITHOUT CONTRAST     
   -  [facility] Consult  Consult CT      /    -  Medical



 CT              Center



               



     DATE: 2016 1:06 AM CST        Read by:  Johnnie Hermosillo  



             Dictated Date/time:  16 01:26  



             Electronically Signed by:  Johnnie Hermosillo           
   16 07:13  



             FINAL REPORT  



     INDICATION: Trauma. Outside hospital exam submitted for 2nd 
interpretation.          



               



               



               



     COMPARISON: None.          



               



               



               



     TECHNIQUE: Study performed at [facility] on 2016 
at 2039 hours. Volumetric acquisition of the cervical spine without contrast. 
Axial, sagittal and coronal reconstructions.          



               



     IV contrast: None.          



               



     DLP: 356 mGy-cm          



               



               



               



     FINDINGS: The spine is imaged from the skull base to the level of T2.     
     



               



               



               



     No acute fracture or malalignment identified in the cervical spine. 
Multilevel disc degenerative changes and facet arthrosis is noted with 
associated spinal canal and neural foraminal narrowing No pre or paravertebral 
hematoma seen.          



               



               



               



     IMPRESSION:          



               



     1.  No acute fracture or malalignment in the cervical spine.          



               



     2.  Advanced cervical spondylosis.          



               



               



               



               

 

 CARDIAC  CK-MB INDEX  1.3  0.0 - 2.5        [facility]



 ENZYMES              [facility]



               



               



               

 

 CARDIAC  CK MB  1.2 ng/mL  0.5 - 3.6        [facility]



 ENZYMES              [facility]



               



               



               

 

 CARDIAC  Total CK  93 unit/L   -       [facility]



 ENZYMES        [address] [place] [address]



               



               



               

 

 CARDIAC  Troponin-I  null  0.00 -        [facility]



 ENZYMES      0.40        [facility]



               



               



               

 

 CARDIAC  Total CK  58 unit/L   -       [facility]



 ENZYMES        /      [facility]



               



               



               

 

 CARDIAC  Troponin-T  null  0.000 -        [facility]



 ENZYMES      0.100  /      [facility]



               



               



               

 

 URINE AND  UA Spec Grav  >=1.050  <=1.030        [facility]              Medical



     *ABN*          Center



               



     (2014 11:35:03 [facility])          



               

 

 URINE AND  UA RBC  1 /HPF  0 - 2        [facility]              [facility]



               



               



               

 

 URINE AND  UA WBC  1 /HPF  0 - 5        [facility]        [address] [place] [address]



               



               



               

 

 URINE AND  UA Sq Epi  Occasional  Few /LPF        [facility]



 STOOL    /LPF    /      [facility]



               



               



               

 

 URINE AND  UA Leuk Est  Negative  Negative        [facility]              [facility]



     (2014 11:35:03 [facility])          [place]



               



               



               

 

 URINE AND  UA Renal Epi  1 /LPF  <=0 /LPF        [facility]              [facility]



               



               



               

 

 URINE AND  UA Mucus  Few /LPF  None Seen        [facility]      /LPF        [facility]



               



               



               

 

 URINE AND  UA Color  Yellow  Yellow        [facility]              Medical



     *NA*          [place]



               



     (2014 11:35:03 [facility])          



               

 

 URINE AND  UA Ketones  Negative  Negative        [facility]    mg/dL  mg/dL  /      [facility]



               



               



               

 

 URINE AND  UA pH  6.5  5.0 - 8.0        [facility]              [facility]



               



               



               

 

 URINE AND  UA Turbidity  Clear  Clear        [facility]              [facility]



     (2014 11:35:03 [facility])          [place]



               



               



               

 

 URINE AND  UA Glucose  Negative  Negative        [facility]    mg/dL  mg/dL        [facility]



               



               



               

 

 URINE AND  UA Protein  50 mg/dL  Negative        [facility]      mg/dL        [facility]



               



               



               

 

 URINE AND  UA Nitrite  Negative  Negative        [facility]              [facility]



     (2014 11:35:03 [facility])          [place]



               



               



               

 

 URINE AND  UA  2.0 mg/dL  0.1 - 1.0        [facility]  Urobilinogen      /      [facility]



               



               



               

 

 URINE AND  UA Blood  Negative  Negative        [facility]              [facility]



     (2014 11:35:03 [facility])          [place]



               



               



               

 

 URINE AND  UA Bili  Negative  Negative        [facility]              Medical



     *NA*          [place]



               



     (2014 11:35:03 [facility])          



               

 

 Brain/Neck  Brain/Neck  EXAM: CT angiogram of the NECK        -  [facility]



 CTA  CTA          -  Medical



     EXAM: CT angiogram of the HEAD          Center



               



     DATE: 2014        Read by:  Brett Long  



             Dictated Date/time:  14 11:58  



             Electronically Signed by:  Brett Long MD      
   14 12:10  



             FINAL REPORT  



     INDICATION:          



               



               



               



     Stroke.          



               



               



               



     DISCUSSION:          



               



               



               



     Rapid acquisition spiral images were obtained between the aortic arch and 
the cranial vertex during  intravenous infusion of  iodinated contrast for the 
purposes of CT angiography. 3-D CT angiographic i          



     mages are created using maximum intensity projection technique. The source 
images are also presented for interpretation. Comparison is made to CT study 
performed 5 hours earlier the same day.          



               



               



               



     There is atherosclerotic calcification of the aortic arch with minimal 
involvement of the great vessel origins.          



               



               



               



     An eccentric low density plaque originates in the left carotid bifurcation 
and extends into the left ICA origin resulting in 60% diameter stenosis. There 
is mild calcified atherosclerotic change in the          



     right ICA origin without stenosis. There is a smaller eccentric plaque in 
the distal left common carotid artery without stenosis. The cavernous carotid 
arteries are unremarkable.          



               



               



               



     There is extrinsic compression on the right vertebral artery by 
hypertrophic C3-C4 facet joints.          



               



               



               



               



               



     The intracranial vessels are unremarkable. There is no stenosis, occlusion
, vasculopathy, aneurysm, or vascular malformation.          



               



               



               



     The intracranial venous structures are normal.          



               



               



               



     The soft tissues of the neck and other incidental structures are normal.  
        



               



               



               



     IMPRESSION:          



               



               



               



     Flow-limiting stenosis at the left ICA origin with soft plaque. No direct 
visual evidence of distal embolization at this time.          



               



               



               



     ( All quantitative and qualitative assessments of the carotid bifurcation 
and proximal internal carotid artery stenosis are made at referencing the 
distal internal carotid artery.)          



               



               

 

 Brain wo  Brain wo  EXAM: MRI BRAIN        [facility]



 contrast  contrast MRI      /    -  [facility]



               



     DATE: 2014 09:23:34 AM        Read by:  Brian Molina  



             Dictated Date/time:  14 09:55  



     INDICATION: Weakness        Electronically Signed by:  Brian Molina MD         14 10:04  



             FINAL REPORT  



               



               



     TECHNIQUE: Sagittal T1, axial FLAIR, axial diffusion, axial T2, and axial 
T1-weighted images of the brain are obtained.          



               



               



               



     FINDINGS:          



               



               



               



     No areas of restricted diffusion are identified to indicate that the 
patient has had a recent infarct.          



               



               



               



     Innumerable foci of T2 star dephasing are present throughout the parietal 
lobes and parasagittal occipital lobes with scattered foci also seen in the 
lateral temporal lobes, primarily posteriorly. Large          



     r areas of T2 star dephasing correspond with regions of gliosis and 
encephalomalacia at the junction of left parietal and occipital lobes and in a 
region of remote infarction. Other parasagittal cortica          



     l foci are seen in the occipital lobes bilaterally.          



               



               



               



     The deep gray matter nuclei fail to demonstrate areas of remote 
hemorrhage.          



               



               



               



     Other portions of the brain parenchyma do not demonstrate regions of 
cortical ischemia.          



               



               



               



     Abnormal T2 signal is present in the periventricular frontal white matter 
in a pattern which is suggestive of watershed ischemia, particularly on the 
left. Overall, the amount of white matter microvascular ischemic change is 
moderate for age.          



               



               



               



     Appropriate flow-voids are present in the vessels at the base of the 
brain. It appears that the patient has a fetal origin or fetal type origin of 
the left posterior cerebral artery from the internal ca          



     rotid artery. A prominent posterior communicating artery is also seen on 
the right. Caliber of the basilar artery is maintained and flow-voids in the 
intradural segments of the vertebral arteries are un          



     remarkable. There is no evidence of dural venous sinus thrombosis.        
  



               



               



               



     Incidental imaging of the orbits, paranasal sinuses, skull, and skull base 
is unremarkable.          



               



               



               



     IMPRESSION:          



               



               



               



     1. No acute or recent infarct.          



               



     2. Extensive remote hemorrhages involving the territories of the posterior 
cerebral arteries bilaterally. Concentration of these numerous small and larger 
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 
vasculitis.          



               



               

 

 CHEM PANEL  Phosphorus  3.4 mg/dL  2.5 - 4.5         [place]



         /2014      [facility]



               



               



               

 

 CHEM PANEL  eGFR  73        1Result Comment: The eGFR is calculated using 
the CKD-EPI formula. In most young, healthy individuals the eGFR will be >90 mL/
min/1.73m2. The eGFR declines with age. An eGFR of 60-89 may be normal in  [facility] 
[place]



     mL/min/1.    some populations, particularly the elderly, for 
whom the CKD-EPI formula has not been extensively validated. Use of the eGFR is 
not recommended in the following populations:  [address]



             Individuals with unstable creatinine concentrations, including 
pregnant patients and those with serious co-morbid conditions.  



               



             Patients with extremes in muscle mass or diet.  



               



             The data above are obtained from the National Kidney Disease 
Education Program (NKDEP) which additionally recommends that when the eGFR is 
used in patients with extremes of body mass index for purposes  



             of drug dosing, the eGFR should be multiplied by the estimated 
BMI.  

 

 CHEM PANEL  CO2  26 meq/L  24 - 32         [place]



         /2014      [facility]



               



               



               

 

 CHEM PANEL  Calcium Lvl  8.3 mg/dL  8.5 - 10.5         [place]



         /2014      [facility]



               



               



               

 

 CHEM PANEL  Glucose Lvl  112 mg/dL  70 - 99      2Interpretive Data: 
Adult reference range values reflect the clinical guidelines   [place]



         /2014    of the American Diabetes Association.  [facility]



               



               



               

 

 CHEM PANEL  Sodium Lvl  140 meq/L  135 - 145         [place]



         /2014      [facility]



               



               



               

 

 CHEM PANEL  BUN  22 mg/dL  7 -        [place]



         /2014      [facility]



               



               



               

 

 CHEM PANEL  Creatinine  1.0 mg/dL  0.5 - 1.4        [facility]



   Lvl      /      [facility]



               



               



               

 

 CHEM PANEL  Potassium Lvl  4.1 meq/L  3.5 - 5.1         [place]



         /2014      [facility]



               



               



               

 

 CHEM PANEL  Chloride Lvl  106 meq/L  95 - 109         [place]



         /2014      [facility]



               



               



               

 

 CHEM PANEL  AGAP  12.1 meq/L  10.0 -         [place]



       20.0        [facility]



               



               



               

 

 CHEM PANEL  Magnesium Lvl  2.0 mg/dL  1.8 - 2.4         [place]



         /2014      [facility]



               



               



               

 

 HEMATOLOGY  RDW  14.3 %  11.5 -         [place]



       14.5        [facility]



               



               



               

 

 HEMATOLOGY  Platelet  181 K/CMM  133 - 450         [place]



         /2014      [facility]



               



               



               

 

 HEMATOLOGY  Hgb  15.6 g/dL  14.0 -         [place]



       18.0        [facility]



               



               



               

 

 HEMATOLOGY  RBC X 10x6  5.06 M/CMM  4.70 -         [place]



       6.10        [facility]



               



               



               

 

 HEMATOLOGY  MCH  30.8 pg  27.0 -         [place]



       31.0        [facility]



               



               



               

 

 HEMATOLOGY  WBC X 10x3  9.1 K/CMM  3.7 - 10.4         [place]



         /2014      [facility]



               



               



               

 

 HEMATOLOGY  MCHC  34.0 g/dL  32.0 -         [place]



       36.0        [facility]



               



               



               

 

 HEMATOLOGY  Hct  45.8 %  42.0 -         [place]



       54.0        [facility]



               



               



               

 

 HEMATOLOGY  MCV  90.6 fL  80.0 -         [place]



       94.0  /      [facility]



               



               



               

 

 HEMATOLOGY  MPV  9.0 fL  7.4 - 10.4         [place]



         /2014      [facility]



               



               



               

 

 HEMATOLOGY  Monocytes #  0.9 K/CMM  0.0 - 0.8         [place]



         /2014      [facility]



               



               



               

 

 HEMATOLOGY  Lymphocytes #  2.2 K/CMM  1.0 - 5.5         [place]



         /2014      [facility]



               



               



               

 

 HEMATOLOGY  Segs-Bands #  5.8 K/CMM  1.5 - 8.1         [place]



         /2014      [facility]



               



               



               

 

 HEMATOLOGY  Eosinophils #  0.2 K/CMM  0.0 - 0.5         [place]



         /2014      [facility]



               



               



               

 

 HEMATOLOGY  Monocytes  9.3 %  2.0 - 12.0         [place]



         /2014      [facility]



               



               



               

 

 HEMATOLOGY  Lymphocytes  24.3 %  20.0 -        [facility]



       40.0  /      [facility]



               



               



               

 

 HEMATOLOGY  Basophils  0.3 %  0.0 - 1.0         [place]



         /2014      [facility]



               



               



               

 

 HEMATOLOGY  Eosinophils  2.6 %  0.0 - 4.0         [place]



         /2014      [facility]



               



               



               

 

 HEMATOLOGY  Segs  63.5 %  45.0 -        [facility]



       75.0  /      [facility]



               



               



               

 

 LIPIDS  CHD Risk  7.17  4.00 -        [facility]



       7.30        [facility]



               



               



               

 

 LIPIDS  LDL  118 mg/dL  <=99 mg/dL        [facility]



   (Calculated)            [facility]



               



               



               

 

 LIPIDS  Trig  118 mg/dL  <=149        [facility]



       mg/dL        [facility]



               



               



               

 

 LIPIDS  Chol  165 mg/dL  <=199        [facility]



       mg/dL        [facility]



               



               



               

 

 LIPIDS  HDL  23 mg/dL  >=61 mg/dL         [place]



         /2014      [facility]



               



               



               

 

 SPECIAL  Hgb A1C  5.6 %  <=5.6 %        [facility]



 CHEMISTRY              [facility]



               



               



               

 

 CHEMISTRY  Albumin Lvl  3.5 g/dL  3.5 - 5.0    Normal     [place]



         /2012      [facility]



               



               



               

 

 CHEMISTRY  ALT  15 U/L  0 - 65    Normal     [place]



         /2012      [facility]



               



               



               

 

 CHEMISTRY  Alk Phos  59 U/L  39 - 136    Normal     [place]



         /2012      [facility]



               



               



               

 

 CHEMISTRY  Bili Total  0.7 mg/dL  0.2 - 1.3    Normal     [place]



         /2012      [facility]



               



               



               

 

 CHEMISTRY  Bili Direct  0.2 mg/dL  0.0 - 0.3    Normal     [place]



         /2012      [facility]



               



               



               

 

 CHEMISTRY  Total Protein  6.4 g/dL  6.4 - 8.4    Normal     [place]



         /2012      [facility]



               



               



               

 

 CHEMISTRY  AST  7 U/L  0 - 37    Normal     [place]



         /2012      [facility]



               



               



               

 

 CHEMISTRY  Bili Indirect  0.5 mg/dL  0.0 - 1.0    Normal    [facility]



               [facility]



               



               



               

 

 CHEMISTRY  A/G Ratio  1.2  0.7 - 1.6    Normal    [facility]



               [facility]



               



               



               

 

 CHEMISTRY  Globulin  2.9 g/dL  2.0 - 4.0    Normal    [facility]



               [facility]



               



               



               

 

 CHEMISTRY  Phosphorus  3.7 mg/dL  2.5 - 4.5    Normal     [place]



         /2012      [facility]



               



               



               

 

 CHEMISTRY  Magnesium Lvl  2.2 mg/dL  1.8 - 2.4    Normal    [facility] [place]



         /2012      [facility]



               



               



               

 

 CHEMISTRY  AGAP  10.9 meq/L  10.0 -  06  Normal    [facility]



       20.0  /      [facility]



               



               



               

 

 CHEMISTRY  Calcium Lvl  8.1 mg/dL  8.5 - 10.5    LOW     [place]



         /2012      [facility]



               



               



               

 

 CHEMISTRY  CO2  30 meq/L  24 - 32    Normal     [place]



         /2012      [facility]



               



               



               

 

 CHEMISTRY  Creatinine  1.2 mg/dL  0.5 - 1.4    Normal    [facility]            [facility]



               



               



               

 

 CHEMISTRY  Glucose Lvl  113 mg/dL  70 - 99  06  HI  1Interpretive   [place]



         /2012    Data: Adult  Medical



             reference  Center



             range values  



             reflect the  



             clinical  



             guidelinesof  



             the American  



             Diabetes  



             Association.  



               



               

 

 CHEMISTRY  BUN  12 mg/dL  7 - 22    Normal    [facility]



               [facility]



               



               



               

 

 CHEMISTRY  Sodium Lvl  139 meq/L  135 - 145    Normal    [facility]



               [facility]



               



               



               

 

 CHEMISTRY  Potassium Lvl  3.9 meq/L  3.5 - 5.1    Normal    [facility]      [facility]



               



               



               

 

 CHEMISTRY  Chloride Lvl  102 meq/L  95 - 109    Normal    [facility]



               [facility]



               



               



               

 

 HEMATOLOGY  Segs  51.2 %  45.0 -  06  Normal    [facility]



       75.0        [facility]



               



               



               

 

 HEMATOLOGY  Basophils  0.7 %  0.0 - 1.0  /  Normal     [place]



         /2012      [facility]



               



               



               

 

 HEMATOLOGY  Eosinophils  12.3 %  0.0 - 4.0  /  [facility] [place]



         /2012      [facility]



               



               



               

 

 HEMATOLOGY  Monocytes  9.3 %  2.0 - 12.0  /  Normal    [facility]



               [facility]



               



               



               

 

 HEMATOLOGY  Lymphocytes  26.5 %  20.0 -  06  Normal    [facility]



       40.0        [facility]



               



               



               

 

 HEMATOLOGY  Basophils #  0.1 K/CMM  0.0 - 0.2  06/  Normal    [facility]



               [facility]



               



               



               

 

 HEMATOLOGY  Eosinophils #  0.9 K/CMM  0.0 - 0.5  06/  [facility] [place]



         /2012      [facility]



               



               



               

 

 HEMATOLOGY  Monocytes #  0.7 K/CMM  0.0 - 0.8  06/  Normal    [facility]



               [facility]



               



               



               

 

 HEMATOLOGY  Lymphocytes #  2.0 K/CMM  1.0 - 5.5  06/  Normal    [facility]



               [facility]



               



               



               

 

 HEMATOLOGY  Segs-Bands #  3.9 K/CMM  1.5 - 8.1  06/  Normal    [facility]



               [facility]



               



               



               

 

 HEMATOLOGY  MCHC  33.7 g/dL  32.0 -  06  Normal    [facility]



       36.0  /      Medical



               Center



               



               



               

 

 HEMATOLOGY  MCH  29.3 pg  27.0 -    Normal    [facility]



       31.0        Medical



               [place]



               



               



               

 

 HEMATOLOGY  MPV  8.7 fL  7.4 - 10.4  06  Normal     [place]



         /2012      Medical



               [place]



               



               



               

 

 HEMATOLOGY  MCV  86.9 fL  80.0 -    Normal    [facility]



       94.0        Medical



               [place]



               



               



               

 

 HEMATOLOGY  Platelet  161 K/CMM  133 - 450  06  Normal     [place]



         /2012      Medical



               [place]



               



               



               

 

 HEMATOLOGY  RDW  14.8 %  11.5 -  06  HI    [facility]



       14.5  /      Medical



               Center



               



               



               

 

 HEMATOLOGY  Hgb  13.4 g/dL  14.0 -  06  LOW    [facility]



       18.0  /      Medical



               [place]



               



               



               

 

 HEMATOLOGY  RBC  4.57 M/CMM  4.70 -  06  LOW    [facility]



       6.10  /      Medical



               [place]



               



               



               

 

 HEMATOLOGY  WBC  7.6 K/CMM  3.7 - 10.4    Normal     [place]



         /2012      [facility]



               



               



               

 

 HEMATOLOGY  Hct  39.7 %  42.0 -    LOW    [facility]



       54.0        Medical



               Center



               



               



               

 

 CHEMISTRY  Glucose Lvl  141 mg/dL  70 - 99    HI  2Interpretive   [place]



         /2012    Data: Adult  Medical



             reference  Center



             range values  



             reflect the  



             clinical  



             guidelinesof  



             the American  



             Diabetes  



             Association.  



               



               

 

 CHEMISTRY  Creatinine  1.2 mg/dL  0.5 - 1.4    Normal    [facility]



   Lvl            [facility]



               



               



               

 

 CHEMISTRY  BUN  14 mg/dL  7 - 22    Normal     [place]



         /2012      [facility]



               



               



               

 

 CHEMISTRY  Potassium Lvl  4.3 meq/L  3.5 - 5.1    Normal     [place]



         /2012      [facility]



               



               



               

 

 CHEMISTRY  Sodium Lvl  137 meq/L  135 - 145    Normal     [place]



         /2012      [facility]



               



               



               

 

 CHEMISTRY  Chloride Lvl  102 meq/L  95 - 109    Normal     [place]



         /2012      [facility]



               



               



               

 

 CHEMISTRY  Calcium Lvl  7.8 mg/dL  8.5 - 10.5    LOW     [place]



         /2012      [facility]



               



               



               

 

 CHEMISTRY  CO2  25 meq/L  24 - 32    Normal     [place]



         /2012      [facility]



               



               



               

 

 CHEMISTRY  AGAP  14.3 meq/L  10.0 -    Normal    [facility]



       20.0        Medical



               Center



               



               



               

 

 HEMATOLOGY  MCH  28.6 pg  27.0 -    Normal    [facility]



       31.0        Medical



               Center



               



               



               

 

 HEMATOLOGY  MCV  87.9 fL  80.0 -    Normal    [facility]



       94.0  /      [facility]



               



               



               

 

 HEMATOLOGY  MPV  8.3 fL  7.4 - 10.4  06/  Normal     [place]



         /2012      [facility]



               



               



               

 

 HEMATOLOGY  Platelet  169 K/CMM  133 - 450  06/  [facility] [place]



         /2012      [facility]



               



               



               

 

 HEMATOLOGY  MCHC  32.5 g/dL  32.0 -  06  Normal    [facility]



       36.0  /      Medical



               [place]



               



               



               

 

 HEMATOLOGY  RDW  14.4 %  11.5 -  06  Normal    [facility]



       14.5        [facility]



               



               



               

 

 HEMATOLOGY  RBC  4.56 M/CMM  4.70 -  06  LOW    [facility]



       6.10  /      Medical



               [place]



               



               



               

 

 HEMATOLOGY  WBC  7.9 K/CMM  3.7 - 10.4  06/  [facility] [place]



         /2012      [facility]



               



               



               

 

 HEMATOLOGY  Hct  40.1 %  42.0 -  06  [facility]



       54.0        [facility]



               



               



               

 

 HEMATOLOGY  Hgb  13.0 g/dL  14.0 -  06  [facility]



       18.0  /      [facility]



               



               



               

 

 HEMATOLOGY  Monocytes #  0.8 K/CMM  0.0 - 0.8  06/  [facility] [place]



         /2012      [facility]



               



               



               

 

 HEMATOLOGY  Eosinophils #  0.3 K/CMM  0.0 - 0.5  06/  [facility] [place]



         /2012      [facility]



               



               



               

 

 HEMATOLOGY  Basophils #  0.0 K/CMM  0.0 - 0.2  06/  [facility] [place]



         /2012      [facility]



               



               



               

 

 HEMATOLOGY  Basophils  0.5 %  0.0 - 1.0  06/  [facility] [place]



         /2012      [facility]



               



               



               

 

 HEMATOLOGY  Eosinophils  4.4 %  0.0 - 4.0  06/  HI     [place]



         /2012      [facility]



               



               



               

 

 HEMATOLOGY  Monocytes  10.0 %  2.0 - 12.0  06/  [facility] [place]



         /2012      [facility]



               



               



               

 

 HEMATOLOGY  Lymphocytes  18.8 %  20.0 -  06/  [facility]



       40.0  /      [facility]



               



               



               

 

 HEMATOLOGY  Lymphocytes #  1.5 K/CMM  1.0 - 5.5  06/  [facility] [place]



         /2012      [facility]



               



               



               

 

 HEMATOLOGY  Segs-Bands #  5.2 K/CMM  1.5 - 8.1    [facility] [place]



         /2012      [facility]



               



               



               

 

 HEMATOLOGY  Segs  66.3 %  45.0 -  06/  [facility]



       75.0  /      Medical



               [place]



               



               



               

 

 HEMATOLOGY  Estimated %  4.5 %  0.0 - 7.5  06/  [facility]



         [facility]



               



               



               

 

 HEMATOLOGY  R-time  0.7 min  0.4 - 0.7    [facility] [place]



         /2012      [facility]



               



               



               

 

 HEMATOLOGY  Rapid TEG  [place]      NA    [facility]



   Sample Type  Whole Bld    /      [facility]



               



               



               

 

 HEMATOLOGY  [place]  0.6 min      NA     [place]



         /2012      [facility]



               



               



               

 

 HEMATOLOGY  ACT (TEG)  113 s  86 - 118    Normal    [facility]      [facility]



               



               



               

 

 HEMATOLOGY  Angle  73 degrees  64 - 80    Normal    [facility]      [facility]



               



               



               

 

 HEMATOLOGY  K-time  1.5 min  0.6 - 2.3    Normal    [facility]      [facility]



               



               



               

 

 HEMATOLOGY  G-value  8.3 K d/sc  5.0 - 11.6    Normal    [facility]      [facility]



               



               



               

 

 HEMATOLOGY  Max Amp  62 mm  52 - 71    Normal    [facility]      [facility]



               



               



               

 

 CHEMISTRY  Sodium Lvl  140 meq/L  135 - 145    Normal    [facility]      [facility]



               



               



               

 

 CHEMISTRY  Creatinine  1.1 mg/dL  0.5 - 1.4    Normal    [facility]            [facility]



               



               



               

 

 CHEMISTRY  Calcium Lvl  8.6 mg/dL  8.5 - 10.5    Normal    [facility]      [facility]



               



               



               

 

 CHEMISTRY  CO2  27 meq/L  24 - 32    Normal    [facility]



               [facility]



               



               



               

 

 CHEMISTRY  Glucose Lvl  105 mg/dL  70 - 99    HI  3Interpretive   [place]



         /2012    Data: Adult  Medical



             reference  Center



             range values  



             reflect the  



             clinical  



             guidelinesof  



             the American  



             Diabetes  



             Association.  



               



               

 

 CHEMISTRY  BUN  15 mg/dL  7 - 22    Normal    [facility]      [facility]



               



               



               

 

 CHEMISTRY  Potassium Lvl  4.1 meq/L  3.5 - 5.1    Normal    [facility]      [facility]



               



               



               

 

 CHEMISTRY  Chloride Lvl  104 meq/L  95 - 109    Normal    [facility]



               [facility]



               



               



               

 

 CHEMISTRY  AGAP  13.1 meq/L  10.0 -    Normal    [facility]



       20.0        [facility]



               



               



               

 

 HEMATOLOGY  Tot Cell Ct  100      NA    [facility]



               [facility]



               



               



               

 

 HEMATOLOGY  RBC Morph  Normal      Normal    [facility]



               [facility]



     (2012 20:03:00)          Center



               



               



               

 

 HEMATOLOGY  Plt Morph  Normal      Normal    [facility]



               [facility]



     (2012 20:03:00)          Center



               



               



               

 

 HEMATOLOGY  Atypical  0.0 %  <=0.0    Normal    [facility]



   Lymphs            [facility]



               



               



               

 

 HEMATOLOGY  Eosinophils  14.0 %  0.0 - 4.0    HI    [facility]



               [facility]



               



               



               

 

 HEMATOLOGY  Monocytes  5.0 %  2.0 - 12.0  /  Normal     [place]



         /      [facility]



               



               



               

 

 HEMATOLOGY  Lymphocytes  33.0 %  20.0 -  06/  Normal     [place]



       40.0  /      [facility]



               



               



               

 

 HEMATOLOGY  Basophils  1.0 %  0.0 - 1.0  06/  Normal     [place]



         /      [facility]



               



               



               

 

 HEMATOLOGY  Segs  47.0 %  45.0 -  06/  Normal     [place]



       75.0  /      [facility]



               



               



               

 

 HEMATOLOGY  Basophils #  0.1 K/CMM  0.0 - 0.2  06/  Normal     [place]



         /      [facility]



               



               



               

 

 HEMATOLOGY  Bands  0.0 %  0.0 - 11.0  06/  Normal     [place]



         /      [facility]



               



               



               

 

 HEMATOLOGY  Lymphocytes #  3.1 K/CMM  1.0 - 5.5  06/  Normal     [place]



         /2012      [facility]



               



               



               

 

 HEMATOLOGY  Eosinophils #  1.3 K/CMM  0.0 - 0.5  06/  HI     [place]



         /2012      [facility]



               



               



               

 

 HEMATOLOGY  Monocytes #  0.5 K/CMM  0.0 - 0.8  /  Normal     [place]



         /2012      [facility]



               



               



               

 

 HEMATOLOGY  Segs-Bands #  4.4 K/CMM  1.5 - 8.1    Normal     [place]



         /2012      [facility]



               



               



               

 

 HEMATOLOGY  PTT  27.4 s  22.9 -  06  Normal  5Interpretive  [facility] [place]



       35.8  /    Data: [facility]  Medical



             Therapeutic  Center



             Range:  57 -  



             92 Seconds  



               



               

 

 HEMATOLOGY  INR  0.99  0.85 -  06  Normal  4Interpretive  [facility] [place]



       1.17      Data:  Medical



             RECOMMENDED  Center



             RANGES FOR  



             PROTIME INR:  



              2.0-3.0 for  



             most medical  



             and surgical  



             thromboemboli  



             c states.  



             2.5-3.5 for  



             artificial  



             heart valves  



             and recurrent  



             embolism.INR  



             SHOULD BE  



             USED ONLY FOR  



             PATIENTS ON  



             STABLE  



             ANTICOAGULANT  



             THERAPY.  



               



               

 

 HEMATOLOGY  PT  13.1 s  12.0 -  06/  Normal    [facility]



       14.7  /      [facility]



               



               



               

 

 HEMATOLOGY  WBC  9.4 K/CMM  3.7 - 10.4  /  Normal     [place]



         /2012      [facility]



               



               



               

 

 HEMATOLOGY  Hct  44.0 %  42.0 -  06/  Normal    [facility]



       54.0  /      [facility]



               



               



               

 

 HEMATOLOGY  Hgb  14.7 g/dL  14.0 -  06  Normal     [place]



       18.0  /      [facility]



               



               



               

 

 HEMATOLOGY  RBC  5.13 M/CMM  4.70 -  06  Normal    [facility]



       6.10  /      [facility]



               



               



               

 

 HEMATOLOGY  RDW  13.5 %  11.5 -  06/  Normal    [facility] [place]



       14.5        [facility]



               



               



               

 

 HEMATOLOGY  Platelet  179 K/CMM  133 - 450    Normal     [place]



         /2012      [facility]



               



               



               

 

 HEMATOLOGY  MCV  85.9 fL  80.0 -    Normal    [facility]



       94.0        [facility]



               



               



               

 

 HEMATOLOGY  MCHC  33.5 g/dL  32.0 -    Normal    [facility]



       36.0  /      [facility]



               



               



               

 

 HEMATOLOGY  MPV  8.4 fL  7.4 - 10.4    Normal     [place]



         /2012      [facility]



               



               



               

 

 HEMATOLOGY  MCH  28.7 pg  27.0 -    Normal    [facility]



       31.0        [facility]



               



               



               







Vital Signs







 Vital Sign  Value  Date  Comments  Source



         

 

 Systolic (mm Hg)  129  2017    [facility]



         

 

 Diastolic (mm Hg)  77  2017    [facility]



         

 

 Respitory Rate  26  2017    [facility]



         

 

 Systolic (mm Hg)  151  2017    [facility]



         

 

 Diastolic (mm Hg)  79  2017    [facility]



         

 

 Respitory Rate  29  2017    [facility]



         

 

 Respitory Rate  22  2017    [facility]



         

 

 Systolic (mm Hg)  118  2017    [facility]



         

 

 Diastolic (mm Hg)  84  2017    [facility]



         

 

 Temperature Oral (F)  97.2 F  2017    [facility]



         

 

 Temperature Oral (F)  97.3 F  2017    [facility]



         

 

 Temperature Oral (F)  96 F  2017    [facility]



         

 

 Height  190.5 cm  2017    [facility]



         

 

 BMI Calculated  22.55  2017    [facility]



         

 

 Weight  81.818  2017    [facility]



         

 

 Heart Rate  69  2017    [facility]



         

 

 Weight  84.091  2017    [facility]



         

 

 Systolic (mm Hg)  160  2016    [facility]



         

 

 Diastolic (mm Hg)  76  2016    [facility]



         

 

 Respitory Rate  15  2016    [facility]



         

 

 Systolic (mm Hg)  107  2016    [facility]



         

 

 Diastolic (mm Hg)  65  2016    [facility]



         

 

 Respitory Rate  14  2016    [facility]



         

 

 Temperature Oral (F)  97.5 F  2016    [facility]



         

 

 Systolic (mm Hg)  149  2016    [facility]



         Center



         

 

 Diastolic (mm Hg)  76  2016    [facility]



         Center



         

 

 Respitory Rate  17  2016    [facility]



         

 

 Temperature Oral (F)  97.2 F  2016    [facility]



         

 

 Temperature Oral (F)  97.7 F  2016    [facility]



         

 

 Weight  84.9  2016    [facility]



         

 

 Weight  85.455  2016    [facility]



         

 

 Height  190.5 cm  2016    [facility]



         

 

 BMI Calculated  23.55  2016    [facility]



         

 

 Weight  85.455  2016    [facility]



         

 

 BMI Calculated  23.55  2016    [facility]



         

 

 Height  190.5 cm  2016    [facility]



         

 

 Heart Rate  68  2016    [facility]



         

 

 Diastolic (mm Hg)  78  2014    [facility]



         

 

 Systolic (mm Hg)  137  2014    [facility]



         

 

 Respitory Rate  31  2014    [facility]



         

 

 Systolic (mm Hg)  159  2014    [facility]



         Center



         

 

 Diastolic (mm Hg)  87  2014    [facility]



         

 

 Respitory Rate  33  2014    [facility]



         

 

 Systolic (mm Hg)  121  2014    [facility]



         Center



         

 

 Diastolic (mm Hg)  54  2014    [facility]



         

 

 Respitory Rate  15  2014    [facility]



         

 

 Temperature Oral (F)  97.9 F  2014    [facility]



         

 

 Heart Rate  90  2014    [facility]



         

 

 Temperature Oral (F)  98.4 F  2014    [facility]



         

 

 Temperature Oral (F)  96.5 F  2014    [facility]



         

 

 Heart Rate  70  2014    [facility]



         

 

 Heart Rate  74  2014    [facility]



         

 

 Weight  90.909  2014    [facility]



         

 

 BMI Calculated  25.05  2014    [facility]



         

 

 Height  190.5 cm  2014    [facility]



         

 

 BMI Calculated  24.8  2014    [facility]



         

 

 Height  190.5 cm  2014    [facility]



         

 

 Weight  90.007  2014    [facility]



         

 

 Diastolic (mm Hg)  84  2012    [facility]



         Center



         

 

 Respitory Rate  18  2012    [facility]



         

 

 Systolic (mm Hg)  142  2012    [facility]



         

 

 Heart Rate  69  2012    [facility]



         

 

 Temperature Oral (F)  98.8 F  2012    [facility]



         

 

 Diastolic (mm Hg)  78  2012    [facility]



         

 

 Temperature Oral (F)  98.0 F  2012    [facility]



         

 

 Respitory Rate  18  2012    [facility]



         

 

 Heart Rate  70  2012    [facility]



         

 

 Systolic (mm Hg)  137  2012    [facility]



         

 

 Diastolic (mm Hg)  81  2012    [facility]



         

 

 Systolic (mm Hg)  139  2012    [facility]



         

 

 Respitory Rate  18  2012    [facility]



         

 

 Heart Rate  71  2012    [facility]



         

 

 Temperature Oral (F)  97.7 F  2012    [facility]



         

 

 Height  190.50 cm  2012    [facility]



         

 

 Weight  90.909  2012    [facility]



         

 

 Height  190.50 cm  2012    [facility]



         

 

 Weight  90.909  2012    [facility]



         







Encounters







 Location  Location  Encounter  Encounter  Reason  Attending  ADM  DC  Status  
Source



   Details  Type  Number  For  Provider  Date  Date    



         Visit          



                   



                   



                   

 

 [facility]    Inpatient  [id number]  FINGER  ROJAS    Active  [facility]



 Medical        FX  EB  /2012    [facility]    Outpatient  [id number]  HOSPITAL  MAO      Active  [facility]



 Medical        F/U  MACHICAO  /      [facility]    Outpatient  [id number]  PORTAL  MAO      Active  [facility]



 [facility]



         S          



                   



                   

 

 [facility]    Outpatient  [id number]  DDC-EGD  MAO  07/10    Active  [facility]



 Medical          MACHICAO  /      [facility]    Inpatient  [id number]  TRANS-IS  LUIS ALFREDO    Active  
[facility]



 Medical        CHEMIC  JAMES  /2014    [facility]    Inpatient  [id number]  _MAPID:[id number]  Kenneth       Larisa Streeter      [id number]  [id number]  Caleb  /2014    [facility]        [id number]          [facility]    Inpatient  [id number]    Lenny       Larisa Humphrey Jr  /2016    [facility]    Emergency  [id number]    Rani       Larisa Welch  /2017    [facility]    Observation  [id number]    Lenny      [facility]



 Ferdinand Humphrey Jr  /2017    [facility]    Outpatient  [id number]  REG Resendiz  [facility]        THROMBOS          Center



         IS          



                   



                   

 

 [facility]    Outpatient  [id number]  HOSPITAL  KATHY Resendiz  [facility]        F/U  [facility]                  Center



                   



                   







Procedures







 Procedure  Code  Date  Perfomer  Comments  Source



           



           

 

 Cholecystectomy  [id number]        [facility]



           

 

 Hip replacement  [id number]        [facility]

## 2018-07-17 NOTE — EDPHYS
Physician Documentation                                                                           

 [facility]                                                                

Name: Darin Garcia Jr                                                                               

Age: 83 yrs                                                                                       

Sex: Male                                                                                         

: 1935                                                                                   

MRN: [id number]                                                                                   

Arrival Date: 2018                                                                          

Time: 09:49                                                                                       

Account#: [id number]                                                                            

Bed CT                                                                                            

Private MD: out of town, doctor                                                                   

ED Physician Gabriel Turcios                                                                       

HPI:                                                                                              

                                                                                             

12:50 This 83 yrs old  Male presents to ER via Ambulatory with complaints of         gs  

      Pneumonia.                                                                                  

12:50 The patient or guardian reports chest pain that is located primarily in the right       gs  

      lateral posterior chest. Onset: 2 day(s) ago. The pain does not radiate. Associated         

      signs and symptoms: Pertinent positives: shortness of breath. The chest pain is             

      described as sharp. Duration: The patient or guardian reports multiple episodes.            

      Modifying factors: the symptoms are aggravated by deep breath. Severity of pain: At its     

      worst the pain was moderate in the emergency department the pain is unchanged. The          

      patient has experienced similar episodes in the past, a few times.                          

                                                                                                  

Historical:                                                                                       

- Allergies:                                                                                      

09:53 No Known Allergies;                                                                     hj  

- Home Meds:                                                                                      

09:53 sertraline oral oral [Active]; vitamin B12-folic acid oral oral [Active];               hj  

- PMHx:                                                                                           

09:53 Anxiety; CVA; Hyperlipidemia; Hypertension; TIA;                                        hj  

- PSHx:                                                                                           

09:53 Appendectomy; prostate removal; left hip replacement; Cholecystectomy;                  hj  

                                                                                                  

- Immunization history:: Adult Immunizations not up to date.                                      

- Social history:: Smoking status: Patient/guardian denies using tobacco,                         

  Patient/guardian denies using alcohol.                                                          

- Ebola Screening: : Patient negative for fever greater than or equal to 101.5 degrees            

  Fahrenheit, and additional compatible Ebola Virus Disease symptoms Patient denies               

  exposure to infectious person Patient denies travel to an Ebola-affected area in the            

  21 days before illness onset.                                                                   

                                                                                                  

                                                                                                  

ROS:                                                                                              

12:50 All other systems are negative.                                                         gs  

                                                                                                  

Exam:                                                                                             

12:50 Head/Face:  Normocephalic, atraumatic. Eyes:  Pupils equal round and reactive to light, gs  

      extra-ocular motions intact.  Lids and lashes normal.  Conjunctiva and sclera are           

      non-icteric and not injected.  Cornea within normal limits.  Periorbital areas with no      

      swelling, redness, or edema. ENT:  Nares patent. No nasal discharge, no septal              

      abnormalities noted.  Tympanic membranes are normal and external auditory canals are        

      clear.  Oropharynx with no redness, swelling, or masses, exudates, or evidence of           

      obstruction, uvula midline.  Mucous membranes moist. Neck:  Trachea midline, no             

      thyromegaly or masses palpated, and no cervical lymphadenopathy.  Supple, full range of     

      motion without nuchal rigidity, or vertebral point tenderness.  No Meningismus.             

      Chest/axilla:  Normal chest wall appearance and motion.  Nontender with no deformity.       

      No lesions are appreciated. Cardiovascular:  Regular rate and rhythm with a normal S1       

      and S2.  No gallops, murmurs, or rubs.  Normal PMI, no JVD.  No pulse deficits.             

      Respiratory:  Lungs have equal breath sounds bilaterally, clear to auscultation and         

      percussion.  No rales, rhonchi or wheezes noted.  No increased work of breathing, no        

      retractions or nasal flaring. Abdomen/GI:  Soft, non-tender, with normal bowel sounds.      

      No distension or tympany.  No guarding or rebound.  No evidence of tenderness               

      throughout. Back:  No spinal tenderness.  No costovertebral tenderness.  Full range of      

      motion. Skin:  Warm, dry with normal turgor.  Normal color with no rashes, no lesions,      

      and no evidence of cellulitis. MS/ Extremity:  Pulses equal, no cyanosis.                   

      Neurovascular intact.  Full, normal range of motion. Neuro:  Awake and alert, GCS 15,       

      oriented to person, place, time, and situation.  Cranial nerves II-XII grossly intact.      

      Motor strength 5/5 in all extremities.  Sensory grossly intact.  Cerebellar exam            

      normal.  Normal gait.                                                                       

12:50 Constitutional: The patient appears alert, awake.                                           

12:50 ECG was reviewed by the Attending Physician.                                                

                                                                                                  

Vital Signs:                                                                                      

09:54  / 63; Pulse 83; Resp 18; Temp 98.2(O); Pulse Ox 95% on R/A; Weight 83.91 kg;     hj  

      Height 6 ft. 3 in. (190.50 cm); Pain 3/10;                                                  

10:29  / 65; Pulse 71; Resp 16 S; Pulse Ox 95% on R/A; Pain 0/10;                       aa5 

11:00  / 50; Pulse 68; Resp 16 S; Pulse Ox 97% on R/A;                                  aa5 

13:38  / 73; Pulse 61; Resp 18; Pulse Ox 97% on R/A;                                    mh5 

14:30  / 71; Pulse 60; Resp 16 S; Pulse Ox 98% on R/A; Pain 0/10;                       aa5 

09:54 Body Mass Index 23.12 (83.91 kg, 190.50 cm)                                             hj  

                                                                                                  

MDM:                                                                                              

10:12 Patient medically screened.                                                             gs  

12:50 Differential diagnosis: coronary artery disease pneumonia, pulmonary embolus, thoracic  gs  

      aortic disection. Data reviewed: vital signs, nurses notes.                                 

                                                                                                  

                                                                                             

10:14 Order name: Basic Metabolic Panel; Complete Time: 11:53                                   

                                                                                             

10:14 Order name: CBC with Diff; Complete Time: 10:54                                           

                                                                                             

10:14 Order name: PT-INR; Complete Time: 11:07                                                  

                                                                                             

10:14 Order name: Troponin (emerg Dept Use Only); Complete Time: 12:11                          

                                                                                             

10:14 Order name: D-Dimer; Complete Time: 11:                                                 

                                                                                             

10:16 Order name: Urine Dipstick--Ancillary (enter results); Complete Time: 14:08               

                                                                                             

10:14 Order name: XRAY Chest (1 view); Complete Time: 11:07                                     

                                                                                             

10:14 Order name: EKG; Complete Time: 10:15                                                     

                                                                                             

10:14 Order name: Cardiac monitoring; Complete Time: 10:16                                      

                                                                                             

10:14 Order name: EKG - Nurse/Tech; Complete Time: 10:29                                        

                                                                                             

11:04 Order name: CT Chest For PE Angio; Complete Time: 12:11                                   

                                                                                             

11:48 Order name: US Extremity Venous W Compression Dominick                                         

                                                                                             

11:53 Order name: Echo w/ Doppler                                                               

                                                                                             

10:14 Order name: IV Saline Lock; Complete Time: 10:29                                          

                                                                                             

10:14 Order name: Labs collected and sent; Complete Time: 10:29                                 

                                                                                             

10:14 Order name: O2 Per Protocol; Complete Time: 10:16                                         

                                                                                             

10:14 Order name: O2 Sat Monitoring; Complete Time: 10:16                                       

                                                                                             

10:14 Order name: Urine Dipstick-Ancillary (obtain specimen); Complete Time: 10:29              

                                                                                                  

EC:50 Rate is 70 beats/min. Rhythm is regular. HI interval is normal. No Q waves. T waves are gs  

      Normal. No ST changes noted. Clinical impression: Abnormal EKG without significant          

      change. Interpreted by me.                                                                  

                                                                                                  

Administered Medications:                                                                         

13:10 Drug: Heparin (DVT/PE Drip) 18 units/kg/hr - (HEParin 94353 units, D5W 500 ml)            

      {Co-Signature: aa5 (Jaja Cadet RN).} Route: IV; Rate: calculated rate; Site: right      

      antecubital;                                                                                

14:30 Follow up: IV Status: Infusion continued upon transfer                                  aa5 

                                                                                                  

                                                                                                  

Disposition:                                                                                      

12:50 Critical Care:.                                                                         gs  

                                                                                                  

Disposition:                                                                                      

18 13:01 Transfer ordered to [facility]. Diagnosis are Pulmonary        

  embolism without acute cor pulmonale, Acute embolism and thrombosis of                          

  unspecified deep veins of distal lower extremity, bilateral.                                    

- Reason for transfer: Higher level of care.                                                      

- Accepting physician is tbd.                                                                     

- Condition is Stable.                                                                            

- Problem is new.                                                                                 

- Symptoms have improved.                                                                         

                                                                                                  

                                                                                                  

                                                                                                  

Critical care time excluding procedures:                                                          

12:50 Critical care time: Bedside Care: 10 minutes, Consultation: 10 minutes, Family          gs  

      Intervention: 10 minutes. Total time: 30 minutes                                            

                                                                                                  

Signatures:                                                                                       

Dispatcher [employer]                           EDKsenia Cope RN RN iw Calderon, Audri, RN                     RN   aa5                                                  

Gregory Puente RN RN                                                      

Gabriel Turcios MD MD                                                      

Jaja Cadet RN                            aa5                                                  

                                                                                                  

Corrections: (The following items were deleted from the chart)                                    

13:12 13:01 2018 13:01 Transfer ordered to [facility]. Diagnosis is   

      Pulmonary embolism without acute cor pulmonale. Reason for transfer: Higher level of        

      care. Accepting physician is tbd. Condition is Stable. Problem is new. Symptoms have        

      improved.                                                                                 

14:49 13:12 2018 13:01 Transfer ordered to [facility]. Diagnosis is aa5 

      Pulmonary embolism without acute cor pulmonale; Acute embolism and thrombosis of            

      unspecified deep veins of distal lower extremity, bilateral. Reason for transfer:           

      Higher level of care. Accepting physician is tbd. Condition is Stable. Problem is new.      

      Symptoms have improved.                                                                   

                                                                                                  

**************************************************************************************************

## 2018-07-17 NOTE — XMS REPORT
DOMINIQUE [facility]

 Created on:2018



Patient:Darin Garcia

Sex:Male

:1935

External Reference #:[id number]





Demographics







 Address  [address] [place] [address]

 

 Phone  [phone]

 

 Preferred Language  en

 

 Marital Status  Unknown

 

 Jewish Affiliation  Unknown

 

 Race  Unreported/Refused to Report

 

 Ethnic Group  Refused to Report









Author







 Organization  [employer]









Care Team Providers







 Name  Role  Phone

 

 Chato Rodriguez  Provider Role  Unavailable









Allergies

No Known Allergies



Problems







 Problem Type  Condition  Code  Onset Dates  Condition Status

 

 Problem  Benign essential HTN  I10    Active

 

 Problem  History of cerebrovascular  Z86.73    Active



   accident      

 

 Problem  Anxiety  F41.9    Active

 

 Problem  Hyperlipidemia, mixed  E78.2    Active

 

 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

 

 Problem  Prostate cancer  C61    Active

 

 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

 

 Problem  Hx of pulmonary embolus  Z86.711    Active

 

 Problem  Traumatic subarachnoid hemorrhage  S06.6X0S    Active



   without loss of consciousness,      



   sequela      

 

 Problem  History of transient ischemic  Z86.73    Active



   attack      

 

 Assessment  Hyperlipidemia, mixed  E78.2    Active

 

 Problem  Intracranial hemorrhage  I62.9    Active

 

 Problem  Depression  F32.9    Active







Medications







 Medication  Code System  Code  Instructions  Start Date  End Date  Status  
Dosage

 

 Zoloft  NDC  75278521507  100 MG Orally      Active  1 tablet



       Once a day        

 

 Repatha  NDC  80913478696  140 MG/ML  ,  Oct 04,  Active  1 ml



       Subcutaneous  2018    



       every 2 weeks        

 

 Aspir-81  NDC  [id number]  81 MG Orally Once      Active  1 tablet



       a day        







Results

No Known Results



Summary Purpose

[employer] Submission

## 2018-07-17 NOTE — XMS REPORT
DOMINIQUE [facility]

 Created on:2018



Patient:Darin Garcia

Sex:Male

:1935

External Reference #:[id number]





Demographics







 Address  [address] [place] [address]

 

 Phone  [phone]

 

 Preferred Language  en

 

 Marital Status  Unknown

 

 Rastafari Affiliation  Unknown

 

 Race  Unreported/Refused to Report

 

 Ethnic Group  Refused to Report









Author







 Organization  [employer]









Care Team Providers







 Name  Role  Phone

 

 Chato Rodriguez  Provider Role  Unavailable









Allergies

No Known Allergies



Problems







 Problem Type  Condition  Code  Onset Dates  Condition Status

 

 Problem  Benign essential HTN  I10    Active

 

 Problem  History of cerebrovascular  Z86.73    Active



   accident      

 

 Problem  Anxiety  F41.9    Active

 

 Problem  Intracranial hemorrhage  I62.9    Active

 

 Problem  Depression  F32.9    Active

 

 Problem  Hyperlipidemia, mixed  E78.2    Active

 

 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

 

 Problem  Prostate cancer  C61    Active

 

 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

 

 Problem  Hx of pulmonary embolus  Z86.711    Active

 

 Problem  Traumatic subarachnoid hemorrhage  S06.6X0S    Active



   without loss of consciousness,      



   sequela      

 

 Problem  History of transient ischemic  Z86.73    Active



   attack      







Medications

No Known Medications



Results

No Known Results



Summary Purpose

[employer] Submission

## 2018-07-17 NOTE — RAD REPORT
EXAM DESCRIPTION:  RAD - Chest Single View - 7/17/2018 10:48 am

 

CLINICAL HISTORY:  CHEST PAIN

Chest pain.

 

COMPARISON:  Chest Single View dated 9/11/2017; Chest Single View dated 9/8/2017; Chest Single View d
ated 7/13/2017; Chest Single View dated 12/28/2016

 

FINDINGS:  Portable technique limits examination quality.

 

Ill-defined opacity is present in the right mid lung, suspicious for developing pneumonia. The heart 
is normal in size. No displaced fractures.

 

IMPRESSION:  Ill-defined opacity in the right mid lung, suspicious for developing pneumonia.

## 2018-07-17 NOTE — XMS REPORT
Summary of Care: 16 - 16

 Created on:March 10, 2068



Patient:ANA PEREZ

Sex:Male

:1935

External Reference #:[id number]





Demographics







 Address  [address] [address]-

 

 Home Phone  [phone]

 

 Email Address  NONE

 

 Preferred Language  English

 

 Marital Status  

 

 Adventism Affiliation  Tenriism

 

 Race  White/

 

 Ethnic Group  Not  or 









Author







 Organization  [facility]

 

 Address  [address] [place] [id number]

 

 Phone  [phone]









Encounter

HQ Ilda_fabio(FIN) [id number] Date(s): 16 - 16

[address] Professional Services provided by        
    [facility]       [place] [address]-  
   [phone]

Discharge Disposition: Home or Self Care

Attending Physician: Magy Ponce MD

Admitting Physician: Lenny Bermeo MD

Referring Physician: Chris Meade MD





Vital Signs







 Most recent to oldest  1  2  3



 [Reference Range]:      

 

 Height  190.5 cm  190.5 cm  



   (16 4:07 AM)  (16 12:25 AM)  

 

 Temperature Oral  97.5 DegF  97.2 DegF  97.7 DegF



 [96.4-99.1 DegF]  (16 8:00 AM)  (16 4:00 AM)  (16 12:00 AM)

 

 Blood Pressure  160/76 mmHg  107/65 mmHg  149/76 mmHg



 [/60-90 mmHg]  *HI*  (16 8:00 AM)  *HI*



   (16 9:00 AM)    (16 7:30 AM)

 

 Respiratory Rate [14-20  15 BRMIN  14 BRMIN  17 BRMIN



 BRMIN]  (16 9:00 AM)  (16 8:00 AM)  (16 7:30 AM)

 

 Peripheral Pulse Rate  68 bpm    



 [ bpm]  (16 12:25 AM)    

 

 Weight  84.9 kg  85.455 kg  85.455 kg



   (16 3:50 PM)  (16 4:07 AM)  (16 12:25 AM)

 

 Body Mass Index  23.55 m2  23.55 m2  



   (16 4:07 AM)  (16 12:25 AM)  







Problem List







 Condition  Effective Dates  Status  Health Status  Informant

 

 Acute pain(Confirmed)    Active    

 

 CVA (cerebral vascular    Resolved    



 accident)(Confirmed)1        

 

 HLD (hyperlipidemia)(Confirmed)    Resolved    

 

 HTN (hypertension)(Confirmed)    Resolved    

 

 Prostate cancer(Confirmed)    Resolved    

 

 TIA (transient ischemic    Resolved    



 attack)(Confirmed)        



1R. sided defecits



Allergies, Adverse Reactions, Alerts







 Substance  Reaction  Severity  Status

 

 NKDA      Active







Medications

atorvastatin 40 mg oral tablet

40 mg=1 tab, PO, Bedtime, # 30 tab

Start Date: 16

Status: Orderedbisacodyl

10 mg, 1 supp, Route: HI, Drug form: SUPP, Daily, Dosing Weight 85.455, kg, PRN 
Constipation, Start date: 16 2:06:00 CST, Duration: 30 day, Stop date:  2:05:00 CST

Notes: (Same As: Dulcolax, Bisco-Lax)

Start Date: 16

Stop Date: 16

Status: Discontinuedcarvedilol 6.25 mg oral tablet

6.25 mg=1 tab, PO, BID, # 180 tab, 0 Refill(s)

Start Date: 16

Status: Ordereddocusate sodium 100 mg oral capsule

100 mg, 1 cap, Route: PO, Drug form: CAP, Q12H, Dosing Weight 85.455, kg, Start 
date: 16 9:00:00 CST, Duration: 30 day, Stop date: 17 21:00:00 CST

Notes: (Same as: Colace) (Do Not Crush)

Start Date: 16

Stop Date: 16

Status: Discontinuedfamotidine

20 mg, 2 mL, Route: IVP, Drug form: INJ, Q12H, Dosing Weight 85.455, kg, Start 
date: 16 9:00:00 CST, Duration: 30 day, Stop date: 17 21:00:00 CST

Notes: (Same as: Pepcid)Can be dilute in 5-10cc NS  IVP: Slow IV push over at 
least 2 minutes.

Start Date: 16

Stop Date: 16

Status: CanceledInsulin regular

4 unit, 0.04 mL, Route: SUB-Q, Drug form: SOLN, Sliding Scale, Dosing Weight 
85.455, kg, PRN Blood Glucose Results, Start date: 16 2:06:00 CST, 
Duration: 30 day, Stop date: 17 2:05:00 CST

Notes: (Same as: Humulin R) Roll in palms of hands gently;  Do not shake 
vigorously. "single patientuse only"(Restricted to patients requiring a dose >  
60 units)WASTE: F/P - Black; E - Municipal Trash Bin  Stable for 28 days at 
room temperatureExpires in ______ days from ______________Date

Start Date: 16

Stop Date: 16

Status: DiscontinuedInsulin regular

2 unit, 0.02 mL, Route: SUB-Q, Drug form: SOLN, Sliding Scale, Dosing Weight 
85.455, kg, PRN Blood Glucose Results, Start date: 16 2:06:00 CST, 
Duration: 30 day, Stop date: 17 2:05:00 CST

Notes: (Same as: Humulin R) Roll in palms of hands gently;  Do not shake 
vigorously. "single patientuse only"(Restricted to patients requiring a dose >  
60 units)WASTE: F/P - Black; E - Municipal Trash Bin  Stable for 28 days at 
room temperatureExpires in ______ days from ______________Date

Start Date: 16

Stop Date: 16

Status: DiscontinuedInsulin regular

8 unit, 0.08 mL, Route: SUB-Q, Drug form: SOLN, Sliding Scale, Dosing Weight 
85.455, kg, PRN Blood Glucose Results, Start date: 16 2:06:00 CST, 
Duration: 30 day, Stop date: 17 2:05:00 CST

Notes: (Same as: Humulin R) Roll in palms of hands gently;  Do not shake 
vigorously. "single patientuse only"(Restricted to patients requiring a dose >  
60 units)WASTE: F/P - Black; E - Municipal Trash Bin  Stable for 28 days at 
room temperatureExpires in ______ days from ______________Date

Start Date: 16

Stop Date: 16

Status: DiscontinuedInsulin regular

6 unit, 0.06 mL, Route: SUB-Q, Drug form: SOLN, Sliding Scale, Dosing Weight 
85.455, kg, PRN Blood Glucose Results, Start date: 16 2:06:00 CST, 
Duration: 30 day, Stop date: 17 2:05:00 CST

Notes: (Same as: Humulin R) Roll in palms of hands gently;  Do not shake 
vigorously. "single patientuse only"(Restricted to patients requiring a dose >  
60 units)WASTE: F/P - Black; E - Municipal Trash Bin  Stable for 28 days at 
room temperatureExpires in ______ days from ______________Date

Start Date: 16

Stop Date: 16

Status: DiscontinuedInsulin regular

10 unit, 0.1 mL, Route: SUB-Q, Drug form: SOLN, Sliding Scale, Dosing Weight 
85.455, kg, PRN Blood Glucose Results, Start date: 16 2:06:00 CST, 
Duration: 30 day, Stop date: 17 2:05:00 CST

Notes: (Same as: Humulin R) Roll in palms of hands gently;  Do not shake 
vigorously. "single patientuse only"(Restricted to patients requiring a dose >  
60 units)WASTE: F/P - Black; E - Municipal Trash Bin  Stable for 28 days at 
room temperatureExpires in ______ days from ______________Date

Start Date: 16

Stop Date: 16

Status: DiscontinuedKeppra + sodium chloride 0.9% 100 mL INJ (for IV set) 100 mL

1,000 mg, Route: IV, ONCE, Dosing Weight 85.455, kg, Priority: STAT, Start date
: 16 1:28:00 CST, Stop date: 16 1:28:00 CST

Notes: Same as KeppraMix with 100 mL NS, LR or D5W  *** MEDICATION WASTE ***
Product Size:  500 mgProduct Wasted:  ___ mg

Start Date: 16

Stop Date: 16

Status: Completedlabetalol

10 mg, 2 mL, Route: IVP, Drug form: INJ, Q15Min, Dosing Weight 85.455, kg, PRN 
Hypertension, Start date: 16 2:06:00 CST, Duration: 3 doses or times, 
Stop date: Limited # of times

Start Date: 16

Stop Date: 16

Status: DiscontinuedlevETIRAcetam

500 mg, 1 tab, Route: PO, Drug form: TAB, Q12H, Dosing Weight 85.455, kg, Start 
date: 16 9:00:00 CST, Duration: 7 day, Stop date: 17 21:00:00 CST

Notes: (Same as:Keppra)

Start Date: 16

Stop Date: 16

Status: DiscontinuedlevETIRAcetam 500 mg oral tablet

500 mg=1 tab, PO, Q12H, # 12 tab, 0 Refill(s)

Start Date: 16

Stop Date: 17

Status: Orderedlisinopril 5 mg oral tablet

5 mg=1 tab, PO, Daily, # 30 tab

Start Date: 16

Status: OrderedMiniprin 81 mg oral enteric coated tablet

81 mg=1 tab, PO, Daily, # 30 tab, 3 Refill(s)

Start Date: 16

Stop Date: 17

Status: Orderedmorphine Sulfate

1 mg, 0.5 mL, Route: IVP, Drug form: INJ, Q4H, Dosing Weight 85.455, kg, PRN 
Pain Score 7-10, Start date: 16 2:06:00 CST, Duration: 30 day, Stop date: 
17 2:05:00 CST

Notes: (Same as:MORPhine Sulfate)

Start Date: 16

Stop Date: 16

Status: DiscontinuedNorco 5/325 oral tablet

1 tab, Route: PO, Drug Form: TAB, Dosing Weight 85.455, kg, Q6H, PRN Pain Score 
4-6, STAT, Start date: 16 2:04:00 CST, Duration: 30 day, Stop date:  2:03:00 CST

Notes: (Same as: Norco 325/5)  Do not exceed 4gm/day of acetaminophen.

Start Date: 16

Stop Date: 16

Status: Discontinuedondansetron

4 mg, 2 mL, Route: IVP, Drug form: INJ, Q8H, Dosing Weight 85.455, kg, PRN 
Nausea &amp; Vomiting, Start date: 16 2:06:00 CST, Duration: 30 day, Stop 
date: 17 2:05:00 CST

Notes: (Same as: Zofran)  *** MEDICATION WASTE ***Product Size:  4 mgProduct 
Wasted:  ___ mg

Start Date: 16

Stop Date: 16

Status: Discontinuedpneumococcal 13-valent vaccine

0.5 mL, Route: IM, Drug Form: INJ, Daily, Start date: 16 9:00:00 CST, 
Duration: 1 doses or times, Stop date: 16 9:00:00 CST

Notes: Lightly roll vial (DO NOT SHAKE) before administration.  (Same as: 
Prevnar 13)

Start Date: 16

Stop Date: 16

Status: CompletedSaline Flush 0.9%

10 ml, Route: IVP, Drug Form: INJ, Dosing Weight 85.455, kg, PRN, PRN Line Flush
, Start date: 16 2:06:00 CST, Duration: 30 day, Stop date: 17 2:05:
00 CST

Notes: Same as: BD Posiflush Sterile

Start Date: 16

Stop Date: 16

Status: DiscontinuedSaline Flush 0.9%

10 ml, Route: IVP, Drug Form: INJ, Dosing Weight 85.455, kg, Q12H, Start date: 
16 9:00:00 CST,Duration: 30 day, Stop date: 17 21:00:00 CST

Notes: Same as: BD Posiflush Sterile

Start Date: 16

Stop Date: 16

Status: DiscontinuedSaline Flush 0.9%

10 mL, Route: IVP, Drug Form: INJ, Dosing Weight 85.455, kg, PRN, PRN Line Flush
, Start date: 16 0:47:00 CST, Duration: 30 day, Stop date: 17 0:46:
00 CST

Notes: Same as: BD Posiflush Sterile

Start Date: 16

Stop Date: 16

Status: Discontinuedsenna

8.6 mg, 1 tab, Route: PO, Drug Form: TAB, Dosing Weight 85.455, kg, Q12H, Start 
date: 16 9:00:00 CST, Duration: 30 day, Stop date: 17 21:00:00 CST

Notes: (Same as: Senokot)

Start Date: 16

Stop Date: 16

Status: Discontinuedsertraline 100 mg oral tablet

100 mg=1 tab, PO, Daily, # 30 tab

Start Date: 16

Status: Orderedsodium chloride 0.9% 1000 ml INJ 1,000 mL

1,000 mL, Rate: 75 ml/hr, Infuse over: 13.3 hr, Route: IV, Dosing Weight 85.455 
kg, Total Volume: 1,000, Start date: 16 1:28:00 CST, Duration: 30 day, 
Stop date: 17 1:27:00 CST

Start Date: 16

Stop Date: 16

Status: Discontinuedtramadol 50 mg oral tablet

50 mg, 1 tab, Route: PO, Drug form: TAB, Q6H, Dosing Weight 85.455, kg, PRN 
Pain Score 4-6, Start date: 16 5:32:00 CST, Duration: 30 day, Stop date: 
17 5:31:00 CST

Notes: Not to exceed 400mg/day. (Same As: Ultram)

Start Date: 16

Stop Date: 16

Status: DiscontinuedTylenol

650 mg, 2 tab, Route: PO, Drug form: TAB, Q6H, Dosing Weight 85.455, kg, PRN 
Pain 1-3/Temp > 100.4 F, Priority: STAT, Start date: 16 2:03:00 CST, 
Duration: 30 day, Stop date: 17 2:02:00 CST

Notes: Do not exceed 4 gm/day.  (Same as: Tylenol)

Start Date: 16

Stop Date: 16

Status: Discontinued



Results

BLOOD BANK RESULTS





 Most recent to oldest [Reference Range]:  1  2  3

 

 ABO/Rh  A POS    



   *Unknown*    



   (16 3:12 AM)    

 

 Antibody Scrn  Negative    



   (16 3:12 AM)    



ELECTROLYTES





 Most recent to oldest [Reference Range]:  1  2  3

 

 Sodium Lvl [135-145 mEq/L]  141 mEq/L    



   (16 12:51 AM)    

 

 Potassium Lvl [3.5-5.1 mEq/L]  4.4 mEq/L    



   (16 12:51 AM)    

 

 Chloride Lvl [ mEq/L]  105 mEq/L    



   (16 12:51 AM)    

 

 CO2 [24-32 mEq/L]  28 mEq/L    



   (16 12:51 AM)    

 

 AGAP [10.0-20.0 mEq/L]  12.4 mEq/L    



   (16 12:51 AM)    



CHEM PANEL





 Most recent to oldest [Reference Range]:  1  2  3

 

 Creatinine Lvl [0.50-1.40 mg/dL]  1.05 mg/dL    



   (16 12:51 AM)    

 

 eGFR  67 mL/min/1.73m2 1    



   *NA*    



   (16 12:51 AM)    

 

 BUN [7-22 mg/dL]  19 mg/dL    



   (16 12:51 AM)    

 

 Glucose Lvl [70-99 mg/dL]  106 mg/dL    



   *HI*    



   (16 12:51 AM)    

 

 Calcium Lvl [8.5-10.5 mg/dL]  8.9 mg/dL    



   (16 12:51 AM)    

 

 Lactic Acid Lvl [0.5-2.2 mMol/L]  0.9 mMol/L    



   (16 12:51 AM)    

 

 Lactic Acid WB [0.5-2.2 mmol/L]  1.2 mmol/L    



   (16 12:51 AM)    



1Result Comment: The eGFR is calculated using the CKD-EPI formula. In most young
, healthy individualsthe eGFR will be >90 mL/min/1.73m2. The eGFR declines with 
age. An eGFR of 60-89 may be normal in some populations, particularly the 
elderly, for whom the CKD-EPI formula has not been extensively validated. Use 
of the eGFR is not recommended in the following populations:



Individuals with unstable creatinine concentrations, including pregnant 
patients and those with serious co-morbid conditions.



Patients with extremes in muscle mass or diet.



The data above are obtained from the National Kidney Disease Education Program (
NKDEP) which additionally recommends that when the eGFR is used in patients 
with extremes of body mass index for purposesof drug dosing, the eGFR should be 
multiplied by the estimated BMI.CARDIAC ENZYMES





 Most recent to oldest  1  2  3



 [Reference Range]:      

 

 Total CK [ unit/L]  113 unit/L  102 unit/L  92 unit/L



   (16 5:33 PM)  (16 11:20 AM)  (16 6:27 AM)

 

 CK MB [0.5-3.6 ng/mL]  1.8 ng/mL  2.1 ng/mL  1.7 ng/mL



   (16 5:33 PM)  (16 11:20 AM)  (16 6:27 AM)

 

 CK MB Index [0.0-2.5]  1.6  2.1  1.8



   (16 5:33 PM)  (16 11:20 AM)  (16 6:27 AM)

 

 Troponin-T [0.000-0.100  <0.010 ng/mL  <0.010 ng/mL  



 ng/mL]  (16 11:20 AM)  (16 6:27 AM)  

 

 Troponin-I [0.00-0.40  <0.02 ng/mL  <0.02 ng/mL  <0.02 ng/mL



 ng/mL]  (16 5:33 PM)  (16 11:20 AM)  (16 6:27 AM)



DRUG SCREEN





 Most recent to oldest [Reference Range]:  1  2  3

 

 U Amph Scr [Negative]  Negative    



   *NA*    



   (16 5:32 AM)    

 

 U Angela Scr [Negative]  Negative    



   *NA*    



   (16 5:32 AM)    

 

 U Benzodia Scr [Negative]  Negative    



   *NA*    



   (16 5:32 AM)    

 

 U Cocaine Scr [Negative]  Negative    



   *NA*    



   (16 5:32 AM)    

 

 U Opiate Scr [Negative]  Negative    



   *NA*    



   (16 5:32 AM)    

 

 U Phencyc Scr [Negative]  Negative    



   *NA*    



   (16 5:32 AM)    

 

 U Cannab Scr [Negative]  Negative    



   *NA*    



   (16 5:32 AM)    

 

 UDS Note  See Note    



   *NA*    



   (16 5:32 AM)    



TOXICOLOGY





 Most recent to oldest [Reference Range]:  1  2  3

 

 Etoh (%)  <.003 %    



   *NA*    



   (16 12:51 AM)    

 

 Ethanol Lvl  <3 mg/dL    



   *NA*    



   (16 12:51 AM)    



URINE AND STOOL





 Most recent to oldest [Reference Range]:  1  2  3

 

 UA Turbidity [Clear]  Clear    



   (16 5:32 AM)    

 

 UA Color [Yellow]  Light Yellow    



   (16 5:32 AM)    

 

 UA pH [5.0-8.0]  7.5    



   (16 5:32 AM)    

 

 UA Spec Grav [<=1.030]  1.007    



   (16 5:32 AM)    

 

 UA Glucose [Negative]  Negative    



   (16 5:32 AM)    

 

 UA Blood [Negative]  Negative    



   (16 5:32 AM)    

 

 UA Ketones [Negative]  Negative    



   (16 5:32 AM)    

 

 UA Protein [Negative]  Negative    



   (16 5:32 AM)    

 

 UA Urobilinogen [0.1-1.0 mg/dL]  0.2 mg/dL    



   (16 5:32 AM)    

 

 UA Bili [Negative]  Negative    



   (16 5:32 AM)    

 

 UA Leuk Est [Negative]  Negative    



   (16 5:32 AM)    

 

 UA Nitrite [Negative]  Negative    



   (16 5:32 AM)    

 

 UA WBC [0-5 /HPF]  3 /HPF    



   (16 5:32 AM)    

 

 UA Sq Epi [Few /LPF]  Occasional /LPF    



   (16 5:32 AM)    

 

 UA Mucus [None Seen /LPF]  Few /LPF    



   (16 5:32 AM)    



HEMATOLOGY





 Most recent to oldest [Reference Range]:  1  2  3

 

 WBC [3.7-10.4 K/CMM]  8.9 K/CMM    



   (16 12:51 AM)    

 

 RBC [4.70-6.10 M/CMM]  4.97 M/CMM    



   (16 12:51 AM)    

 

 Hgb [14.0-18.0 g/dL]  15.5 g/dL    



   (16 12:51 AM)    

 

 Hct [42.0-54.0 %]  45.5 %    



   (16 12:51 AM)    

 

 MCV [80.0-94.0 fL]  91.5 fL    



   (16 12:51 AM)    

 

 MCH [27.0-31.0 pg]  31.2 pg    



   *HI*    



   (16 12:51 AM)    

 

 MCHC [32.0-36.0 g/dL]  34.1 g/dL    



   (16 12:51 AM)    

 

 RDW [11.5-14.5 %]  13.6 %    



   (16 12:51 AM)    

 

 Platelet [133-450 K/CMM]  160 K/CMM    



   (16 12:51 AM)    

 

 MPV [7.4-10.4 fL]  8.9 fL    



   (16 12:51 AM)    

 

 Segs [45.0-75.0 %]  51.1 %    



   (16 12:51 AM)    

 

 Lymphocytes [20.0-40.0 %]  29.0 %    



   (16 12:51 AM)    

 

 Monocytes [2.0-12.0 %]  10.7 %    



   (16 12:51 AM)    

 

 Eosinophils [0.0-4.0 %]  8.3 %    



   *HI*    



   (16 12:51 AM)    

 

 Basophils [0.0-1.0 %]  0.9 %    



   (16 12:51 AM)    

 

 Segs-Bands # [1.5-8.1 K/CMM]  4.6 K/CMM    



   (16 12:51 AM)    

 

 Lymphocytes # [1.0-5.5 K/CMM]  2.6 K/CMM    



   (16 12:51 AM)    

 

 Monocytes # [0.0-0.8 K/CMM]  0.9 K/CMM    



   *HI*    



   (16 12:51 AM)    

 

 Eosinophils # [0.0-0.5 K/CMM]  0.7 K/CMM    



   *HI*    



   (16 12:51 AM)    

 

 Basophils # [0.0-0.2 K/CMM]  0.1 K/CMM    



   (16 12:51 AM)    

 

 ACT (TEG) Rapid [ seconds]  113 seconds    



   (16 12:51 AM)    

 

 [place] Rapid  0.6 minutes    



   *NA*    



   (16 12:51 AM)    

 

 R-time Rapid [0.4-0.7 minutes]  0.7 minutes    



   (16 12:51 AM)    

 

 K-time Rapid [0.6-2.3 minutes]  1.7 minutes    



   (16 12:51 AM)    

 

 Angle Rapid [64-80 degrees]  73 degrees    



   (16 12:51 AM)    

 

 Max Amplitude Rapid [52-71 mm]  58 mm    



   (16 12:51 AM)    

 

 G-value Rapid [5.0-11.6 K d/sc]  7.0 K d/sc    



   (16 12:51 AM)    

 

 Estimated % Lysis Rapid [0.0-7.5 %]  1.2 %    



   (16 12:51 AM)    







Immunizations

Given and Recorded





 Vaccine  Date  Status  Refusal Reason

 

 pneumococcal 13-valent vaccine  16  Given  







Procedures







 Procedure  Date  Related Diagnosis  Body Site

 

 Cholecystectomy      

 

 Hip replacement      







Social History







 Social History Type  Response

 

 Substance Abuse  Use: None.

 

 Alcohol  Never

 

 Smoking Status  Never smoker; Exposure to Tobacco Smoke None; Cigarette Smoking



   Last 365 Days No; Reg Smoking Cessation Counseling No







Assessment and Plan

No data available for this section

## 2018-07-17 NOTE — XMS REPORT
Summary of Care: 17 - 17

 Created on:2123



Patient:ANA PEREZ

Sex:Male

:1935

External Reference #:[id number]





Demographics







 Address  [address] [address]-

 

 Home Phone  [phone]

 

 Email Address  NONE

 

 Preferred Language  English

 

 Marital Status  

 

 Taoism Affiliation  Tenriism

 

 Race  Other

 

 Additional Race(s)  Other

 

 Ethnic Group  Not  or 









Author







 Organization  [facility]

 

 Address  [address] [place] [address]-

 

 Phone  [phone]









Encounter

HQ Encntr_alias(FIN) [id number] Date(s): 17 - 17

[address] Professional Services provided by        
    [facility]       [place] [address]-  
   [phone]

Discharge Disposition: Home or Self Care

Attending Physician: Lenny Bermeo MD

Admitting Physician: Lenny Bermeo MD





Vital Signs







 Most recent to oldest  1  2  3



 [Reference Range]:      

 

 Height  190.5 cm    



   (17 1:04 AM)    

 

 Temperature Oral [96.4-99.1  97.2 DegF  97.3 DegF  96 DegF



 DegF]  (17 12:00 PM)  (17 8:00 AM)  *LOW*



       (17 6:00 AM)

 

 Blood Pressure [/60-90  129/77 mmHg  151/79 mmHg  118/84 mmHg



 mmHg]  (17 2:02 PM)  *HI*  (17 12:02 PM)



     (17 1:00 PM)  

 

 Respiratory Rate [14-20  26 BRMIN  29 BRMIN  22 BRMIN



 BRMIN]  *HI*  *HI*  *HI*



   (17 2:02 PM)  (17 1:00 PM)  (17 12:02 PM)

 

 Peripheral Pulse Rate [  69 bpm    



 bpm]  (17 7:00 PM)    

 

 Weight  81.818 kg  84.091 kg  



   (17 1:04 AM)  (17 7:00 PM)  

 

 Body Mass Index  22.55 m2    



   (17 1:04 AM)    







Problem List







 Condition  Effective Dates  Status  Health Status  Informant

 

 Acute pain(Confirmed)    Active    

 

 CVA (cerebral vascular    Resolved    



 accident)(Confirmed)1        

 

 HLD (hyperlipidemia)(Confirmed)    Resolved    

 

 HTN (hypertension)(Confirmed)    Resolved    

 

 Prostate cancer(Confirmed)    Resolved    

 

 TIA (transient ischemic    Resolved    



 attack)(Confirmed)        



1R. sided defecits



Allergies, Adverse Reactions, Alerts







 Substance  Reaction  Severity  Status

 

 NKDA      Active







Medications

acetaminophen-hydrocodone 325 mg-5 mg oral tablet

1 tab, Route: PO, Drug Form: TAB, Dosing Weight 84.091, kg, Q4H, PRN Pain Score 
1-3, Start date: 17 0:53:00 CST, Duration: 30 day, Stop date: 18 0:
52:00 CST

Notes: (Same as: Norco 325/5)  Do not exceed 4gm/day of acetaminophen.

Start Date: 17

Stop Date: 17

Status: Discontinuedatorvastatin

40 mg, 1 tab, Route: PO, Drug form: TAB, Bedtime, Dosing Weight 84.091, kg, 
Start date: 17 21:00:00 CST, Duration: 30 day, Stop date: 18 21:00:
00 CST

Notes: (Same as: Lipitor)

Start Date: 17

Stop Date: 17

Status: Canceledcarvedilol

6.25 mg, 1 tab, Route: PO, Drug form: TAB, BID, Dosing Weight 84.091, kg, Start 
date: 17 9:00:00 CST, Duration: 30 day, Stop date: 18 17:00:00 CST

Notes: Give with food. (Same As: Coreg)

Start Date: 17

Stop Date: 17

Status: DiscontinuedDextrose 50% Syringe

12.5 gm, 25 mL, Route: IVP, Drug Form: INJ, Dosing Weight 84.091, kg, PRN, PRN 
Abnormal Lab Result, Start date: 17 0:53:00 CST, Duration: 30 day, Stop 
date: 18 0:52:00 CST

Start Date: 17

Stop Date: 17

Status: DiscontinuedDextrose 50% Syringe

25 gm, 50 mL, Route: IVP, Drug Form: INJ, Dosing Weight 84.091, kg, PRN, PRN 
Abnormal Lab Result, Start date: 17 0:53:00 CST, Duration: 30 day, Stop 
date: 18 0:52:00 CST

Start Date: 17

Stop Date: 17

Status: DiscontinuedDextrose 50% Syringe

6.25 gm, 12.5 mL, Route: IVP, Drug Form: INJ, Dosing Weight 84.091, kg, PRN, 
PRN Abnormal Lab Result, Start date: 17 0:53:00 CST, Duration: 30 day, 
Stop date: 18 0:52:00 CST

Start Date: 17

Stop Date: 17

Status: Discontinueddocusate

100 mg, 1 cap, Route: PO, Drug form: CAP, Q12H, Dosing Weight 84.091, kg, Start 
date: 17 9:00:00 CST, Duration: 30 day, Stop date: 18 21:00:00 CST

Notes: (Same as: Colace) (Do Not Crush)

Start Date: 17

Stop Date: 17

Status: Discontinuedheparin 5000 units/mL injectable solution

5,000 unit, 1 mL, Route: SUB-Q, Drug form: INJ, Q8H, Dosing Weight 81.818, kg, 
Start date: 17 8:00:00 CST, Duration: 30 day, Stop date: 18 0:00:00 
CST

Notes: porcine heparin

Start Date: 17

Stop Date: 17

Status: CanceledhydrALAZINE

10 mg, 0.5 mL, Route: IV, Drug form: INJ, Q6H, Dosing Weight 84.091, kg, PRN 
Hypertension, Start date: 17 0:55:00 CST, Duration: 30 day, Stop date:  0:54:00 CST

Notes: (Same as: Apresoline)Push over 5 minutes

Start Date: 17

Stop Date: 17

Status: Discontinuedinsulin regular 100 units/mL human recombinant

3 unit, 0.03 mL, Route: SUB-Q, Drug form: SOLN, PRN, Dosing Weight 84.091, kg, 
PRN Abnormal Lab Result, Start date: 17 0:53:00 CST, Duration: 30 day, 
Stop date: 18 0:52:00 CST

Notes: (Same as: Humulin R) Roll in palms of hands gently;  Do not shake 
vigorously. "single patientuse only"(Restricted to patients requiring a dose &gt
;  60 units)WASTE: F/P - Black; E - Municipal Trash Bin  Stable for 28 days at 
room temperatureExpires in ______ days from ______________Date

Start Date: 17

Stop Date: 17

Status: Discontinuedinsulin regular 100 units/mL human recombinant

7 unit, 0.07 mL, Route: SUB-Q, Drug form: SOLN, PRN, Dosing Weight 84.091, kg, 
PRN Abnormal Lab Result, Start date: 17 0:53:00 CST, Duration: 30 day, 
Stop date: 18 0:52:00 CST

Notes: (Same as: Humulin R) Roll in palms of hands gently;  Do not shake 
vigorously. "single patientuse only"(Restricted to patients requiring a dose &gt
;  60 units)WASTE: F/P - Black; E - Municipal Trash Bin  Stable for 28 days at 
room temperatureExpires in ______ days from ______________Date

Start Date: 17

Stop Date: 17

Status: Discontinuedinsulin regular 100 units/mL human recombinant

5 unit, 0.05 mL, Route: SUB-Q, Drug form: SOLN, PRN, Dosing Weight 84.091, kg, 
PRN Abnormal Lab Result, Start date: 17 0:53:00 CST, Duration: 30 day, 
Stop date: 18 0:52:00 CST

Notes: (Same as: Humulin R) Roll in palms of hands gently;  Do not shake 
vigorously. "single patientuse only"(Restricted to patients requiring a dose &gt
;  60 units)WASTE: F/P - Black; E - Municipal Trash Bin  Stable for 28 days at 
room temperatureExpires in ______ days from ______________Date

Start Date: 17

Stop Date: 17

Status: Discontinuedlabetalol

10 mg, 2 mL, Route: IVP, Drug form: INJ, Q15Min, Dosing Weight 84.091, kg, PRN 
Hypertension, Start date: 17 0:55:00 CST, Duration: 30 day, Stop date:  0:54:00 CST

Start Date: 17

Stop Date: 17

Status: DiscontinuedlevETIRAcetam

500 mg, 1 tab, Route: PO, Drug form: TAB, Q12H, Dosing Weight 84.091, kg, Start 
date: 17 9:00:00 CST, Duration: 30 day, Stop date: 18 21:00:00 CST

Notes: (Same as:Keppra)

Start Date: 17

Stop Date: 17

Status: DiscontinuedlevETIRAcetam + Sodium Chloride 0.9%  mL

1,000 mg, Route: IVPB, ONCE, Dosing Weight 84.091, kg, Start date: 17 0:53
:00 CST, Stop date: 17 0:53:00 CST

Notes: Same as KeppraMix with 100 mL NS, LR or D5W  *** MEDICATION WASTE ***
Product Size:  500 mgProduct Wasted:  ___ mg

Start Date: 17

Stop Date: 17

Status: CompletedlevETIRAcetam 500 mg oral tablet

500 mg=1 tab, PO, Q12H, # 12 tab, 0 Refill(s)

Start Date: 17

Stop Date: 17

Status: Orderedlisinopril

5 mg, 1 tab, Route: PO, Drug form: TAB, Daily, Dosing Weight 84.091, kg, Start 
date: 17 9:00:00 CST, Duration: 30 day, Stop date: 18 9:00:00 CST

Notes: (Same as: Prinivil, Zestril)

Start Date: 17

Stop Date: 17

Status: Discontinuedondansetron

4 mg, 2 mL, Route: IVP, Drug form: INJ, Q8H, Dosing Weight 84.091, kg, PRN 
Nausea &amp; Vomiting, Start date: 17 0:53:00 CST, Duration: 30 day, Stop 
date: 18 0:52:00 CST

Notes: (Same as: Zofran)  *** MEDICATION WASTE ***Product Size:  4 mgProduct 
Wasted:  ___ mg

Start Date: 17

Stop Date: 17

Status: DiscontinuedSaline Flush 0.9%

10 ml, Route: IVP, Drug Form: INJ, Dosing Weight 84.091, kg, Q12H, Start date: 
17 9:00:00 CST,Duration: 30 day, Stop date: 18 21:00:00 CST

Notes: Same as: BD Posiflush Sterile

Start Date: 17

Stop Date: 17

Status: DiscontinuedSaline Flush 0.9%

10 ml, Route: IVP, Drug Form: INJ, Dosing Weight 84.091, kg, PRN, PRN Line Flush
, Start date: 17 0:53:00 CST, Duration: 30 day, Stop date: 18 0:52:
00 CST

Notes: Same as: BD Posiflush Sterile

Start Date: 17

Stop Date: 17

Status: Discontinuedsenna

8.6 mg, 1 tab, Route: PO, Drug Form: TAB, Dosing Weight 84.091, kg, Q12H, Start 
date: 17 9:00:00 CST, Duration: 30 day, Stop date: 18 21:00:00 CST

Notes: (Same as: Senokot)

Start Date: 17

Stop Date: 17

Status: Discontinuedsertraline

100 mg, 1 tab, Route: PO, Drug form: TAB, Daily, Dosing Weight 84.091, kg, 
Start date: 17 9:00:00 CST, Duration: 30 day, Stop date: 18 9:00:00 
CST

Notes: (Same as: Zoloft)

Start Date: 17

Stop Date: 17

Status: DiscontinuedSodium Chloride 0.9% IV 1,000 mL

1,000 mL, Rate: 100 ml/hr, Infuse over: 10 hr, Route: IV, Dosing Weight 84.091 
kg, Total Volume: 1,000, Start date: 17 0:53:00 CST, Duration: 30 day, 
Stop date: 18 0:52:00 CST, 2.11, m2

Start Date: 17

Stop Date: 17

Status: Discontinued



Results

ELECTROLYTES





 Most recent to oldest [Reference Range]:  1

 

 Sodium Lvl [135-145 mEq/L]  141 mEq/L



   (17 8:05 PM)

 

 Potassium Lvl [3.5-5.1 mEq/L]  3.6 mEq/L



   (17 8:05 PM)

 

 Chloride Lvl [ mEq/L]  108 mEq/L



   (17 8:05 PM)

 

 CO2 [24-32 mEq/L]  27 mEq/L



   (17 8:05 PM)

 

 AGAP [10.0-20.0 mEq/L]  9.6 mEq/L



   *LOW*



   (17 8:05 PM)



CHEM PANEL





 Most recent to oldest [Reference Range]:  1

 

 Creatinine Lvl [0.50-1.40 mg/dL]  0.86 mg/dL



   (17 8:05 PM)

 

 eGFR  82 mL/min/1.73m2 1



   *NA*



   (17 8:05 PM)

 

 BUN [7-22 mg/dL]  16 mg/dL



   (17 8:05 PM)

 

 B/C Ratio [6-25]  19



   (17 8:05 PM)

 

 Glucose Lvl [70-99 mg/dL]  107 mg/dL



   *HI*



   (17 8:05 PM)

 

 Total Protein [6.4-8.4 g/dL]  6.8 g/dL



   (17 8:05 PM)

 

 Albumin Lvl [3.5-5.0 g/dL]  3.2 g/dL



   *LOW*



   (17 8:05 PM)

 

 Globulin [2.7-4.2 g/dL]  3.6 g/dL



   (17 8:05 PM)

 

 A/G Ratio [0.7-1.6]  0.9



   (17 8:05 PM)

 

 Calcium Lvl [8.5-10.5 mg/dL]  7.9 mg/dL



   *LOW*



   (17 8:05 PM)

 

 ALT [0-65 unit/L]  18 unit/L



   (17 8:05 PM)

 

 AST [0-37 unit/L]  23 unit/L



   (17 8:05 PM)

 

 Alk Phos [ unit/L]  56 unit/L



   (17 8:05 PM)

 

 Bili Total [0.2-1.3 mg/dL]  1.2 mg/dL



   (17 8:05 PM)



1Result Comment: The eGFR is calculated using the CKD-EPI formula. In most young
, healthy individualsthe eGFR will be &gt;90 mL/min/1.73m2. The eGFR declines 
with age. An eGFR of 60-89 may be normal insome populations, particularly the 
elderly, for whom the CKD-EPI formula has not been extensively validated. Use 
of the eGFR is not recommended in the following populations:



Individuals with unstable creatinine concentrations, including pregnant 
patients and those with serious co-morbid conditions.



Patients with extremes in muscle mass or diet.



The data above are obtained from the National Kidney Disease Education Program (
NKDEP) which additionally recommends that when the eGFR is used in patients 
with extremes of body mass index for purposesof drug dosing, the eGFR should be 
multiplied by the estimated BMI.HEMATOLOGY





 Most recent to oldest [Reference Range]:  1

 

 WBC [3.7-10.4 K/CMM]  6.7 K/CMM



   (17 8:05 PM)

 

 RBC [4.70-6.10 M/CMM]  4.59 M/CMM



   *LOW*



   (17 8:05 PM)

 

 Hgb [14.0-18.0 g/dL]  14.5 g/dL



   (17 8:05 PM)

 

 Hct [42.0-54.0 %]  42.1 %



   (17 8:05 PM)

 

 MCV [80.0-94.0 fL]  91.8 fL



   (17 8:05 PM)

 

 MCH [27.0-31.0 pg]  31.6 pg



   *HI*



   (17 8:05 PM)

 

 MCHC [32.0-36.0 g/dL]  34.5 g/dL



   (17 8:05 PM)

 

 RDW [11.5-14.5 %]  13.6 %



   (17 8:05 PM)

 

 Platelet [133-450 K/CMM]  152 K/CMM



   (17 8:05 PM)

 

 MPV [7.4-10.4 fL]  8.8 fL



   (17 8:05 PM)

 

 Segs [45.0-75.0 %]  48.7 %



   (17 8:05 PM)

 

 Lymphocytes [20.0-40.0 %]  32.7 %



   (17 8:05 PM)

 

 Monocytes [2.0-12.0 %]  8.6 %



   (17 8:05 PM)

 

 Eosinophils [0.0-4.0 %]  8.7 %



   *HI*



   (17 8:05 PM)

 

 Basophils [0.0-1.0 %]  1.3 %



   *HI*



   (17 8:05 PM)

 

 Segs-Bands # [1.5-8.1 K/CMM]  3.3 K/CMM



   (17 8:05 PM)

 

 Lymphocytes # [1.0-5.5 K/CMM]  2.2 K/CMM



   (17 8:05 PM)

 

 Monocytes # [0.0-0.8 K/CMM]  0.6 K/CMM



   (17 8:05 PM)

 

 Eosinophils # [0.0-0.5 K/CMM]  0.6 K/CMM



   *HI*



   (17 8:05 PM)

 

 Basophils # [0.0-0.2 K/CMM]  0.1 K/CMM



   (17 8:05 PM)

 

 PT [12.0-14.7 seconds]  13.4 seconds



   (17 8:05 PM)

 

 INR [0.85-1.17]  1.02



   (17 8:05 PM)

 

 PTT [22.9-35.8 seconds]  27.9 seconds



   (17 8:05 PM)







Immunizations

Given and Recorded





 Vaccine  Date  Status  Refusal Reason

 

 pneumococcal 13-valent vaccine  16  Given  







Procedures







 Procedure  Date  Related Diagnosis  Body Site

 

 Cholecystectomy      

 

 Hip replacement      







Social History







 Social History Type  Response

 

 Substance Abuse  Use: None.

 

 Alcohol  Never

 

 Smoking Status  Never smoker; Type: Cigarettes; Exposure to Tobacco Smoke None;



   Cigarette Smoking Last 365 Days No; Reg Smoking Cessation



   Counseling No







Assessment and Plan

No data available for this section

## 2018-07-17 NOTE — XMS REPORT
[facility]

 Created on:2018



Patient:Darin Garcia

Sex:Male

:1935

External Reference #:[id number]





Demographics







 Address  [address] [place] [address]

 

 Phone  [phone]

 

 Preferred Language  en

 

 Marital Status  Unknown

 

 Restorationist Affiliation  Unknown

 

 Race  Unreported/Refused to Report

 

 Ethnic Group  Refused to Report









Author







 Organization  [employer]









Care Team Providers







 Name  Role  Phone

 

 Lela Marla  Provider Role  Unavailable









Allergies, Adverse Reactions, Alerts







 Substance  Reaction  Event Type

 

 N.K.D.A.  Info Not Available  Non Drug Allergy







Problems







 Problem Type  Condition  Code  Onset Dates  Condition Status

 

 Problem  Benign essential HTN  I10    Active

 

 Problem  History of cerebrovascular  Z86.73    Active



   accident      

 

 Problem  Anxiety  F41.9    Active

 

 Problem  Hyperlipidemia, mixed  E78.2    Active

 

 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

 

 Problem  Prostate cancer  C61    Active

 

 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

 

 Problem  Hx of pulmonary embolus  Z86.711    Active

 

 Problem  Traumatic subarachnoid hemorrhage  S06.6X0S    Active



   without loss of consciousness,      



   sequela      

 

 Problem  History of transient ischemic  Z86.73    Active



   attack      

 

 Assessment  Prostate cancer  C61    Active

 

 Assessment  Fatigue, unspecified type  R53.83    Active

 

 Problem  Intracranial hemorrhage  I62.9    Active

 

 Problem  Depression  F32.9    Active







Medications







 Medication  Code System  Code  Instructions  Start Date  End Date  Status  
Dosage

 

 Zoloft  NDC  90534346673  100 MG Orally      Active  1 tablet



       Once a day        

 

 Aspir-81  NDC  74145462879  81 MG Orally Once      Active  1 tablet



       a day        

 

 Repatha  NDC  [id number]  140 MG/ML  ,  Oct 04,  Active  1 ml



       Subcutaneous  2018    



       every 2 weeks        







Results







 Name  Result  Date  Reference Range  Unit  Abnormality Flag

 

 URINALYSIS AUTO W/O SCOPE          



 (43822)          

 

 ----PIERO  trace  2018      

 

 ----NIT  pos  2018      

 

 ----PROTEIN  neg  2018      

 

 ----pH  5.5  2018      

 

 ----BLO  neg  2018      

 

 ----GLUCOSE  neg  2018      

 

 ----BILIRUBIN  1+  2018      

 

 ----KETONES  trace  2018      

 

 ----SPECIFIC GRAVITY  >=1.030  2018      

 

 PVR          

 

 ----PVR  0  2018      







Summary Purpose

[employer] Submission

## 2018-07-17 NOTE — XMS REPORT
Encounter CCD: 07/10/2012 to 07/10/2012

 Created on:2050



Patient:ANA PEREZ

Sex:Male

:1935

External Reference #:[id number]





Demographics







 Address  [address] [place] [address]-

 

 Home Phone  [phone]

 

 Preferred Language  Unknown

 

 Marital Status  

 

 [employer] Affiliation  Congregational

 

 Race  White/

 

 Ethnic Group  Non-









Author







 Organization  [facility]









Care Team Providers







 Name  Role  Phone

 

 Mat Blanco  Referring Provider  [phone]









Allergies, Adverse Reactions, Alerts







 Substance  Reaction  Status

 

 NKDA    Active







Problem List







 Condition  Effective Dates  Status

 

 Acute pain    Active